# Patient Record
Sex: FEMALE | Race: WHITE | HISPANIC OR LATINO | URBAN - METROPOLITAN AREA
[De-identification: names, ages, dates, MRNs, and addresses within clinical notes are randomized per-mention and may not be internally consistent; named-entity substitution may affect disease eponyms.]

---

## 2019-03-22 ENCOUNTER — TRANSFERRED RECORDS (OUTPATIENT)
Dept: HEALTH INFORMATION MANAGEMENT | Facility: CLINIC | Age: 49
End: 2019-03-22

## 2019-04-08 NOTE — TELEPHONE ENCOUNTER
RECORDS RECEIVED FROM: External - Dr. Rodgers - [facility]   DATE RECEIVED: 4/16/19   NOTES (FOR ALL VISITS) STATUS DETAILS   OFFICE NOTE from referring provider Received 3/22/19  1/8/19   OFFICE NOTE from other specialist N/A    DISCHARGE SUMMARY from hospital N/A    DISCHARGE REPORT from the ER N/A    OPERATIVE REPORT N/A    MEDICATION LIST Received     IMAGING  (FOR ALL VISITS)     EMG N/A    EEG N/A    ECT N/A    MRI (HEAD, NECK, SPINE) N/A    CT (HEAD, NECK, SPINE) N/A      Action    Action Taken Records request faxed to [facility]    (fax) [phone]

## 2019-04-15 NOTE — TELEPHONE ENCOUNTER
Phone Call:     Contact Name [facility]   Outcome Records will be faxed

## 2019-04-16 ENCOUNTER — PRE VISIT (OUTPATIENT)
Dept: NEUROLOGY | Facility: CLINIC | Age: 49
End: 2019-04-16

## 2019-04-16 ENCOUNTER — OFFICE VISIT (OUTPATIENT)
Dept: NEUROLOGY | Facility: CLINIC | Age: 49
End: 2019-04-16
Payer: COMMERCIAL

## 2019-04-16 VITALS
BODY MASS INDEX: 28.55 KG/M2 | WEIGHT: 136 LBS | HEART RATE: 63 BPM | DIASTOLIC BLOOD PRESSURE: 77 MMHG | SYSTOLIC BLOOD PRESSURE: 139 MMHG | HEIGHT: 58 IN | OXYGEN SATURATION: 98 %

## 2019-04-16 DIAGNOSIS — G20.C PARKINSONISM, UNSPECIFIED PARKINSONISM TYPE (H): Primary | ICD-10-CM

## 2019-04-16 DIAGNOSIS — G20.C PARKINSONISM, UNSPECIFIED PARKINSONISM TYPE (H): ICD-10-CM

## 2019-04-16 LAB — VIT B12 SERPL-MCNC: 4325 PG/ML (ref 193–986)

## 2019-04-16 ASSESSMENT — UNIFIED PARKINSONS DISEASE RATING SCALE (UPDRS)
PRONATION_SUPINATION_RIGHT: MILD: ANY OF THE FOLLOWING: A) 3 TO 5 INTERRUPTIONS DURING TAPPING B) MILD SLOWING C) THE AMPLITUDE DECREMENTS MIDWAY IN THE 10-MOVEMENT SEQUENCE
HANDMOVEMENTS_LEFT: SLIGHT: ANY OF THE FOLLOWING: A) THE REGULAR RHYTHM IS BROKEN WITH ONE WITH ONE OR TWO INTERRUPTIONS OR HESITATIONS OF THE MOVEMENT B) SLIGHT SLOWING C) THE AMPLITUDE DECREMENTS NEAR THE END OF THE 10 MOVEMENTS.
SPEECH: NORMAL
TOETAPPING_LEFT: NORMAL
PARKINSONS_MEDS: OFF
TOTAL_SCORE: 26
RIGIDITY_LLE: NORMAL
TOTAL_SCORE: 17
FREEZING_GAIT: NORMAL
AMPLITUDE_LLE: NORMAL: NO TREMOR.
RIGIDITY_RUE: MILD: RIGIDITY DETECTED WITHOUT THE ACTIVATION MANEUVER.  FULL RANGE OF MOTION IS EASILY ACHIEVED.
ARISING_CHAIR: SLIGHT: ARISING IS SLOWER THAN NORMAL, OR MAY NEED MORE THAN ONE ATTEMPT, OR MAY NEED TO MOVE FORWARD IN THE CHAIR TO ARISE.  NO NEED TO USE THE ARMS OF THE CHAIR.
LEG_AGILITY_LEFT: NORMAL
AXIAL_SCORE: 3
RIGIDITY_LUE: SLIGHT: RIGIDITY ONLY DETECTED WITH ACTIVATION MANEUVER.
TOETAPPING_RIGHT: NORMAL
RIGIDITY_NECK: NORMAL
PRONATION_SUPINATION_LEFT: NORMAL
FACIAL_EXPRESSION: NORMAL.
AMPLITUDE_LUE: NORMAL: NO TREMOR.
FINGER_TAPPING_RIGHT: MODERATE: ANY OF THE FOLLOWING:  A) MORE THAN 5 INTERRUPTIONS OR AT LEAST ONE LONGER ARREST (FREEZE) IN ONGOING MOVEMENT  B) MODERATE SLOWING C) THE AMPLITUDE DECREMENTS STARTING AFTER THE FIRST MOVEMENT.
AMPLITUDE_LIP_JAW: NORMAL: NO TREMOR.
TOTAL_SCORE_LEFT: 3
GAIT: MILD: INDEPENDENT WALKING BUT WITH SUBSTANTIAL GAIT IMPAIRMENT.
POSTURAL_STABILITY: NORMAL:  RECOVERS WITH ONE OR TWO STEPS.
SPONTANEITY_OF_MOVEMENT: 0: NORMAL.  NO PROBLEMS.
FINGER_TAPPING_LEFT: SLIGHT: ANY OF THE FOLLOWING: A) THE REGULAR RHYTHM IS BROKEN WITH ONE WITH ONE OR TWO INTERRUPTIONS OR HESITATIONS OF THE MOVEMENT B) SLIGHT SLOWING C) THE AMPLITUDE DECREMENTS NEAR THE END OF THE 10 MOVEMENTS.
RIGIDITY_RLE: NORMAL
CONSTANCY_TREMOR_ATREST: MODERATE: TREMOR AT REST IS PRESENT 51-75% OF THE ENTIRE EXAMINATION PERIOD.
HANDMOVEMENTS_RIGHT: MODERATE: ANY OF THE FOLLOWING:  A) MORE THAN 5 INTERRUPTIONS  OR AT LEAST ONE LONGER ARREST (FREEZE) IN ONGOING MOVEMENT  B) MODERATE SLOWING C) THE AMPLITUDE DECREMENTS STARTING AFTER THE FIRST MOVEMENT.
AMPLITUDE_RUE: MODERATE: 3 - 10 CM IN MAXIMAL AMPLITUDE.
AMPLITUDE_RLE: NORMAL: NO TREMOR.
LEG_AGILITY_RIGHT: NORMAL
POSTURE: 0 NORMAL, NO PROBLEMS

## 2019-04-16 ASSESSMENT — MIFFLIN-ST. JEOR: SCORE: 1132.67

## 2019-04-16 ASSESSMENT — PAIN SCALES - GENERAL: PAINLEVEL: NO PAIN (0)

## 2019-04-16 NOTE — LETTER
2019       RE: Marta Osorio  [address]  [place] [address]     Dear Colleague,    Thank you for referring your patient, Marta Osorio, to the [facility] NEUROLOGY at [facility] acute hospital. Please see a copy of my visit note below.    Department of Neurology  Movement Disorders Division   Initial Clinic Evaluation     Patient: Marta Osorio   MRN: [id number]   : 1970   Date of Visit: 2019     Referring Physician: Ana Maria    Reason for Referral: Tremor    Impression:  1.  Probable mild idiopathic Parkinson's disease  2.  Hypothyroidism on replacement    Recommendations:  1.  MRI of the brain without contrast  2.  Ceruloplasmin, B12 level  3.  See back after the above.  If  the testing is normal as we expect it would be reasonable to give the patient a trial of carbidopa levodopa    We would like to thank Dr. Rodgers for allowing us to participate in this patient's care.    Please call or write with questions or concerns,    Sincerely yours,    Harvey Rivera MD      History of Present Illness  Ms. Jamie Osorio is a 49 year old female who is right-handed.  She works cleaning offices.  She lives in [place].  She comes with her sister-in-law.  Communication was through a [facility]  and communication level was judged as being excellent.    The patient's only other problems are hypothyroidism on replacement.  She has no other medical illnesses and takes no chronic medication.  In preparing this report I reviewed records from Dr. Mcginnis.  Dr. Haas indicated that the patient had right arm tremor at rest for about 2 months.  In reviewing the patient's medication list there was nothing on it that would cause tremor.  Parkinson's disease was suspected and the patient was referred for movement disorder evaluation.    The patient states that in 2019 she was crossing the street and fell.  She suffered  significant facial trauma.  She had quite a bit of bruising around the left orbit.  She did not lose consciousness.  She had x-rays done of the area but no CT scan or MRI scan of the brain.    Shortly after that she began to notice right arm resting tremor.  The tremor goes away if she moves her hand.  Others notice it when she walks.  It has become more severe over the past 2 months.  In the office mild right leg resting tremor was noted.  She does not notice any tremor on the left and no facial tremor.  The patient says the right hand is normally coordinated.  She can brush her teeth with the right hand and scrub her hair with the right hand without difficulty.  She really does not do much hand writing or keyboarding and does not know if it is affecting that at all.  It is not affecting her ability to eat.    She has no family history of parkinsonism or tremor.  She has had no falls other than the ones noted above.  She has had no head injury other than the one noted above.  Again noted is that she has is taking no medicines that would block dopamine.  She has no REM behavior disorder.  She may drool into her pillow at night.  Speech is normal.  Gait appears normal.  She has no imbalance.  She does get some aching in her thighs when she walks.  At times it is hard to get up from a chair.  Her memory is good.  She has no dysautonomia.  She denies any depression.    Past Medical History:   No past medical history on file.    Past Surgical History:   No past surgical history on file.    Medications:  No current outpatient medications on file.        Movement Disorder-related Medications                                                                                                                                                             Allergies: has No Known Allergies.    Social History:   Social History     Socioeconomic History     Marital status: Single     Spouse name: None     Number of children: None      Years of education: None     Highest education level: None   Occupational History     None   Social Needs     Financial resource strain: None     Food insecurity:     Worry: None     Inability: None     Transportation needs:     Medical: None     Non-medical: None   Tobacco Use     Smoking status: Never Smoker     Smokeless tobacco: Never Used   Substance and Sexual Activity     Alcohol use: Not Currently     Drug use: None     Sexual activity: None   Lifestyle     Physical activity:     Days per week: None     Minutes per session: None     Stress: None   Relationships     Social connections:     Talks on phone: None     Gets together: None     Attends Voodoo service: None     Active member of club or organization: None     Attends meetings of clubs or organizations: None     Relationship status: None     Intimate partner violence:     Fear of current or ex partner: None     Emotionally abused: None     Physically abused: None     Forced sexual activity: None   Other Topics Concern     None   Social History Narrative     None       Family History:  No family history on file.    ROS:  General:  Fever : no, Chills: no, Sweats: no, Fatigue: no, ,Weight loss: no  Cardiovascular  Chest Pains: no, Palpitations: no, Edema: no, Shortness of breath: no  Respiratory  Cough: no  Gastrointestinal  Nausea: no, Vomiting: no, Diarrhea: no, Constipation: no  Genitourinary  Dysuria: no, Frequency: no, Urgency: no,  Nocturia: no, Incontinence: no Women:  Abnormal vaginal bleeding: no  Musculoskeletal  Back pain: no, Neck Pain: no  Joint pain, swelling, redness: no, Stiffness: no  Skin  Rash: no, Itching: no,  Suspicious lesions: no  Psychiatric  Depression: no, Anxiety/Panic: no  Endocrine  Cold intolerance: no, Heat intolerance: no, Excessive thirst: no  Hematologic/LymphatiAbnormal bruising, bleeding: no ,Enlarged lymph nodes: {.:[id number]  Allergic/Immunologic  Hives (Urticaria): no, HIV exposure: no    Neurologic  Visual loss:  no, Double vision: no, Headache: no, Loss of consciousness:  no, Seizure: no, Fainting (syncope): no, Dysarthria: no, Dysphagia:  no, Vertigo:  no, Weakness: no, Atrophy: no, Twitches: no, Lhermitte's: no, Numbness or tingling: no, Handwriting change: no, Tremors: no, Involuntary movements: no, Imbalance: no, Abnormal gait:  no, Falls :no, Memory loss: no, RBD: no, Sleep disorder: no, Hallucination: no, Loss of concentration: no,  Behavioral change: no,  Loss of motivation: no      Comprehensive Neurologic Exam    Mental Status Exam   The patient is alert, oriented and exhibits no difficulty with cognition or memory.  A formal short test of mental status was performed.     Neurovascular         Speech and Language   Right Left     Carotid Bruit Absent Absent  Speech is normal.   Dorsalis Pedis Pulse Normal Normal  Description   Posterior Tibial Pulse Normal Normal  Language is normal.     Cranial Nerve Exam                 Right Left   Right Left   II                                        Normal Normal  Hearing (VIII) Normal Normal      III, IV, V!                   Normal Normal  Nystagmus (VIII) Normal Normal   EOM description                              Gag (IX, X) Normal Normal   Facial Sensation (V) Normal Normal  SCM (XI) Normal Normal   Muscles of Mastication (V) Normal Normal  Trapezius (XI) Normal Normal   Facial Strength (VII) Normal Normal  Tongue (XII) Normal Normal     Motor Exam  Strength (*/5 grading)  Muscle                  Right Left  Muscle Right Left   Frontalis                                           Normal Normal  Iliopsoas Normal    Normal          Orbicularis Oculi                     Normal Normal  Quadrideps Normal    Normal        Orbicularis Oris                         Normal Normal  Anterior Tibial Normal Normal      Deltoid Normal Normal  Gastrocnemius Normal    Normal   Biceps Normal Normal  Extensor Hallucis Longus Normal    Normal   Triceps Normal Normal  Toe Extensors Normal     Normal   EDC Normal Normal  Toe Flexor Normal    Normal   Finger Flexors Normal Normal  Other Normal Normal   First Dorsal Interosseous Normal Normal  Other Normal Normal   Hypothenar Normal Normal  Other Normal Normal   Thenar Normal Normal  Other Normal Normal     Reflexes      Tone   Right Left   Right Left   Biceps Normal Normal  Spasticity (S)  Rigidity (R)     Triceps Normal Normal  Neck     Brachioradialis Normal Normal  Arm     Quadriceps Normal Normal  Leg     Ankle Normal Normal       Babkinski Flexor Flexor         UPDRS Values 4/16/2019   Time: 7:53 AM   Medication Off   R Brain DBS: None   L Brain DBS: None   Speech 0   Facial Expression 0   Rigidity Neck 0   Rigidity RUE 2   Rigidity LUE 1   Rigidity RLE 0   Rigidity LLE 0   Finger Taps R 3   Finger Taps L 1   Hand Mvt R 3   Hand Mvt L 1   Pron-/Supinate R 2   Pron-/Supinate L 0   Toe Tap R 0   Toe Tap L 0   Leg Agility R 0   Leg Agility L 0   Arise From Chair 1   Gait 2   Gait Freezing 0   Postural Stability 0   Posture 0   Global Spont Mvt 0   Postural Tremor RUE 2   Postural Tremor LUE 0   Kinetic Tremor RUE 2   Kinetic Tremor LUE 0   Rest Tremor RUE 3   Rest Tremor LUE 0   Rest Tremor RLE 0   Rest Tremor LLE 0   Rest Tremor Lip/Jaw 0   Rest Tremor Constancy 3   Total Right 17   Total Left 3   Axial Total 3   Total 26       Sensory Exam          Right Left    Pin Normal Normal    Vibration Normal Normal    Joint position Normal Normal    Other          Coding statement:     Duration of  Services: face-to-face 90 min.   Greater than 50% of this visit was spent in counseling and coordination of care.    Again, thank you for allowing me to participate in the care of your patient.      Sincerely,    Harvey Rivera MD

## 2019-04-16 NOTE — PATIENT INSTRUCTIONS
1.  You appear to have early Parkinson's disease.  We will check an MRI of your brain and some blood work to make sure it is not something else    2.  I will see you back after your testing and we can start medication for you

## 2019-04-16 NOTE — PROGRESS NOTES
Department of Neurology  Movement Disorders Division   Initial Clinic Evaluation     Patient: Marta Osorio   MRN: [id number]   : 1970   Date of Visit: 2019     Referring Physician: Ana Maria    Reason for Referral: Tremor    Impression:  1.  Probable mild idiopathic Parkinson's disease  2.  Hypothyroidism on replacement      Recommendations:  1.  MRI of the brain without contrast  2.  Ceruloplasmin, B12 level  3.  See back after the above.  If  the testing is normal as we expect it would be reasonable to give the patient a trial of carbidopa levodopa    We would like to thank Dr. Rodgers for allowing us to participate in this patient's care.      Please call or write with questions or concerns,    Sincerely yours,    Harvey Rivera MD      History of Present Illness  Ms. Jamie Osorio is a 49 year old female who is right-handed.  She works cleaning offices.  She lives in [place].  She comes with her sister-in-law.  Communication was through a [facility]  and communication level was judged as being excellent.    The patient's only other problems are hypothyroidism on replacement.  She has no other medical illnesses and takes no chronic medication.  In preparing this report I reviewed records from Dr. Rodgers.  Dr. Rodgers indicated that the patient had right arm tremor at rest for about 2 months.  In reviewing the patient's medication list there was nothing on it that would cause tremor.  Parkinson's disease was suspected and the patient was referred for movement disorder evaluation.    The patient states that in 2019 she was crossing the street and fell.  She suffered significant facial trauma.  She had quite a bit of bruising around the left orbit.  She did not lose consciousness.  She had x-rays done of the area but no CT scan or MRI scan of the brain.    Shortly after that she began to notice right arm resting tremor.  The tremor goes away if she moves her hand.  Others  notice it when she walks.  It has become more severe over the past 2 months.  In the office mild right leg resting tremor was noted.  She does not notice any tremor on the left and no facial tremor.  The patient says the right hand is normally coordinated.  She can brush her teeth with the right hand and scrub her hair with the right hand without difficulty.  She really does not do much hand writing or keyboarding and does not know if it is affecting that at all.  It is not affecting her ability to eat.    She has no family history of parkinsonism or tremor.  She has had no falls other than the ones noted above.  She has had no head injury other than the one noted above.  Again noted is that she has is taking no medicines that would block dopamine.  She has no REM behavior disorder.  She may drool into her pillow at night.  Speech is normal.  Gait appears normal.  She has no imbalance.  She does get some aching in her thighs when she walks.  At times it is hard to get up from a chair.  Her memory is good.  She has no dysautonomia.  She denies any depression.      Past Medical History:   No past medical history on file.    Past Surgical History:   No past surgical history on file.    Medications:  No current outpatient medications on file.          Movement Disorder-related Medications                                                                                                                                                             Allergies: has No Known Allergies.    Social History:   Social History     Socioeconomic History     Marital status: Single     Spouse name: None     Number of children: None     Years of education: None     Highest education level: None   Occupational History     None   Social Needs     Financial resource strain: None     Food insecurity:     Worry: None     Inability: None     Transportation needs:     Medical: None     Non-medical: None   Tobacco Use     Smoking status: Never  Smoker     Smokeless tobacco: Never Used   Substance and Sexual Activity     Alcohol use: Not Currently     Drug use: None     Sexual activity: None   Lifestyle     Physical activity:     Days per week: None     Minutes per session: None     Stress: None   Relationships     Social connections:     Talks on phone: None     Gets together: None     Attends Roman Catholic service: None     Active member of club or organization: None     Attends meetings of clubs or organizations: None     Relationship status: None     Intimate partner violence:     Fear of current or ex partner: None     Emotionally abused: None     Physically abused: None     Forced sexual activity: None   Other Topics Concern     None   Social History Narrative     None       Family History:  No family history on file.    ROS:  General:  Fever : no, Chills: no, Sweats: no, Fatigue: no, ,Weight loss: no  Cardiovascular  Chest Pains: no, Palpitations: no, Edema: no, Shortness of breath: no  Respiratory  Cough: no  Gastrointestinal  Nausea: no, Vomiting: no, Diarrhea: no, Constipation: no  Genitourinary  Dysuria: no, Frequency: no, Urgency: no,  Nocturia: no, Incontinence: no Women:  Abnormal vaginal bleeding: no  Musculoskeletal  Back pain: no, Neck Pain: no  Joint pain, swelling, redness: no, Stiffness: no  Skin  Rash: no, Itching: no,  Suspicious lesions: no  Psychiatric  Depression: no, Anxiety/Panic: no  Endocrine  Cold intolerance: no, Heat intolerance: no, Excessive thirst: no  Hematologic/LymphatiAbnormal bruising, bleeding: no ,Enlarged lymph nodes: {.:[id number]  Allergic/Immunologic  Hives (Urticaria): no, HIV exposure: no    Neurologic  Visual loss: no, Double vision: no, Headache: no, Loss of consciousness:  no, Seizure: no, Fainting (syncope): no, Dysarthria: no, Dysphagia:  no, Vertigo:  no, Weakness: no, Atrophy: no, Twitches: no, Lhermitte's: no, Numbness or tingling: no, Handwriting change: no, Tremors: no, Involuntary movements: no, Imbalance:  no, Abnormal gait:  no, Falls :no, Memory loss: no, RBD: no, Sleep disorder: no, Hallucination: no, Loss of concentration: no,  Behavioral change: no,  Loss of motivation: no      Comprehensive Neurologic Exam    Mental Status Exam   The patient is alert, oriented and exhibits no difficulty with cognition or memory.  A formal short test of mental status was performed.     Neurovascular         Speech and Language   Right Left     Carotid Bruit Absent Absent  Speech is normal.   Dorsalis Pedis Pulse Normal Normal  Description   Posterior Tibial Pulse Normal Normal  Language is normal.     Cranial Nerve Exam                 Right Left   Right Left   II                                        Normal Normal  Hearing (VIII) Normal Normal      III, IV, V!                   Normal Normal  Nystagmus (VIII) Normal Normal   EOM description                              Gag (IX, X) Normal Normal   Facial Sensation (V) Normal Normal  SCM (XI) Normal Normal   Muscles of Mastication (V) Normal Normal  Trapezius (XI) Normal Normal   Facial Strength (VII) Normal Normal  Tongue (XII) Normal Normal     Motor Exam  Strength (*/5 grading)  Muscle                  Right Left  Muscle Right Left   Frontalis                                           Normal Normal  Iliopsoas Normal    Normal          Orbicularis Oculi                     Normal Normal  Quadrideps Normal    Normal        Orbicularis Oris                         Normal Normal  Anterior Tibial Normal Normal      Deltoid Normal Normal  Gastrocnemius Normal    Normal   Biceps Normal Normal  Extensor Hallucis Longus Normal    Normal   Triceps Normal Normal  Toe Extensors Normal    Normal   EDC Normal Normal  Toe Flexor Normal    Normal   Finger Flexors Normal Normal  Other Normal Normal   First Dorsal Interosseous Normal Normal  Other Normal Normal   Hypothenar Normal Normal  Other Normal Normal   Thenar Normal Normal  Other Normal Normal     Reflexes      Tone   Right Left   Right  Left   Biceps Normal Normal  Spasticity (S)  Rigidity (R)     Triceps Normal Normal  Neck     Brachioradialis Normal Normal  Arm     Quadriceps Normal Normal  Leg     Ankle Normal Normal       Babkinski Flexor Flexor         UPDRS Values 4/16/2019   Time: 7:53 AM   Medication Off   R Brain DBS: None   L Brain DBS: None   Speech 0   Facial Expression 0   Rigidity Neck 0   Rigidity RUE 2   Rigidity LUE 1   Rigidity RLE 0   Rigidity LLE 0   Finger Taps R 3   Finger Taps L 1   Hand Mvt R 3   Hand Mvt L 1   Pron-/Supinate R 2   Pron-/Supinate L 0   Toe Tap R 0   Toe Tap L 0   Leg Agility R 0   Leg Agility L 0   Arise From Chair 1   Gait 2   Gait Freezing 0   Postural Stability 0   Posture 0   Global Spont Mvt 0   Postural Tremor RUE 2   Postural Tremor LUE 0   Kinetic Tremor RUE 2   Kinetic Tremor LUE 0   Rest Tremor RUE 3   Rest Tremor LUE 0   Rest Tremor RLE 0   Rest Tremor LLE 0   Rest Tremor Lip/Jaw 0   Rest Tremor Constancy 3   Total Right 17   Total Left 3   Axial Total 3   Total 26         Sensory Exam          Right Left    Pin Normal Normal    Vibration Normal Normal    Joint position Normal Normal    Other             Coding statement:     Duration of  Services: face-to-face 90 min.   Greater than 50% of this visit was spent in counseling and coordination of care.

## 2019-04-18 LAB — CERULOPLASMIN SERPL-MCNC: 33 MG/DL (ref 23–53)

## 2019-07-09 ENCOUNTER — OFFICE VISIT (OUTPATIENT)
Dept: NEUROLOGY | Facility: CLINIC | Age: 49
End: 2019-07-09
Payer: COMMERCIAL

## 2019-07-09 VITALS — DIASTOLIC BLOOD PRESSURE: 83 MMHG | SYSTOLIC BLOOD PRESSURE: 139 MMHG | OXYGEN SATURATION: 99 % | HEART RATE: 71 BPM

## 2019-07-09 DIAGNOSIS — G20.A1 PARKINSON DISEASE (H): Primary | ICD-10-CM

## 2019-07-09 RX ORDER — LEVOTHYROXINE SODIUM 125 UG/1
125 TABLET ORAL DAILY
COMMUNITY
End: 2023-05-09

## 2019-07-09 RX ORDER — CARBIDOPA AND LEVODOPA 25; 100 MG/1; MG/1
TABLET ORAL
Qty: 100 TABLET | Refills: 3 | Status: SHIPPED | OUTPATIENT
Start: 2019-07-09 | End: 2019-09-10

## 2019-07-09 ASSESSMENT — PAIN SCALES - GENERAL: PAINLEVEL: NO PAIN (0)

## 2019-07-09 NOTE — PROGRESS NOTES
Movement Disorder Clinic follow up note    Patient: Marta Osorio  Medical Record Number: [id number]  Encounter Date: July 9, 2019  PCP:Nazanin Rodgers    CC: Parkinson's disease    Impression:  1.  Mild idiopathic Parkinson's disease    Recommendations:  1.  Patient was intolerant of the MRI of the head.  I had received a note that they felt she needed general anesthesia.  I do not think the risk of general anesthesia is warranted for her screening MRI.  We will start her on carbidopa levodopa and only consider the MRI if her response is atypical.  I discussed this with her and she is in agreement.    2.  We will start carbidopa levodopa as usual.  She will take carbidopa levodopa, 25/100, 1/2 tablet 3 times a day, 1/2 to 1-hour before meals.  She was warned of possible nausea.  She will escalate slowly to 1 tablet 3 times a day after 1 week and increase to 1-1/2 tablets 3 times a day as needed.    3.  This was all described in detail through an  and communication was excellent.  She asked appropriate questions.  I will see her back in 2 months to see her response to the medication.    Return to clinic: 2 months    Interval Hx:: Ms. Marta Osorio returns to clinic today for follow-up of who comes in follow-up today.  She is accompanied by .  Is also with her young daughter.    She was intolerant of the MRI.  I had received a note from the technician that it was felt she would need general anesthesia.  She apparently began panicking and had severe movements.    We decided to go ahead and start the carbidopa levodopa.  Her target symptom is tremor.    Current medications    Movement Disorder-related Medications                                                                                                                                                             Current Outpatient Medications   Medication     carbidopa-levodopa (SINEMET)  MG tablet      levothyroxine (SYNTHROID/LEVOTHROID) 125 MCG tablet     No current facility-administered medications for this visit.        Examination:  /83 (BP Location: Left arm, Patient Position: Sitting, Cuff Size: Adult Regular)   Pulse 71   SpO2 99%   General- no distress, no rash, good pulses, no edema  Respiratory-auscultation over the anterior lung fields is clear  Cardiac- regular rate and rhythm    Neurologic  Mental status  Patient is alert, appropriate, speech is fluent and comprehension is intact    Cranial nerve testing  Pupils are equal and reactive to light, visual fields are full to confrontation bilaterally  Extraocular movements are full  Facial sensation is intact, face is symmetric with rest and activation  Palate rises symmetrically, tongue protrudes at midline with normal movements  Sterocleidomastoid and trapezius strength is normal and symmetric    Motor  Bulk and tone are normal  Strength is 5/5 shoulder abduction, finger abduction, arm flexion and extension, hip flexion, plantar and dorsiflexion    Sensation  Intact to pin, vibration   Proprioception intact in great toes and fingers    Tendon reflexes  Testing at brachioradialis, biceps, triceps, patella and achilles bilaterally showed normal reflexes, symmetrically, without Babinski or Marin signs    Coordination  Finger nose finger testing: normalRapid alternating movements are normal.  Gait and Station: normal    25 minutes of total time was spent face-to-face with the patient over 50% of which was counseling..

## 2019-07-09 NOTE — LETTER
7/9/2019       RE: Marta Osorio  [address]  [place] [address]     Dear Colleague,    Thank you for referring your patient, Marta Osorio, to the [facility] NEUROLOGY at [facility]. Please see a copy of my visit note below.    Movement Disorder Clinic follow up note    Patient: Marta Osorio  Medical Record Number: [id number]  Encounter Date: July 9, 2019  PCP:Nazanin Rodgers    CC: Parkinson's disease    Impression:  1.  Mild idiopathic Parkinson's disease    Recommendations:  1.  Patient was intolerant of the MRI of the head.  I had received a note that they felt she needed general anesthesia.  I do not think the risk of general anesthesia is warranted for her screening MRI.  We will start her on carbidopa levodopa and only consider the MRI if her response is atypical.  I discussed this with her and she is in agreement.    2.  We will start carbidopa levodopa as usual.  She will take carbidopa levodopa, 25/100, 1/2 tablet 3 times a day, 1/2 to 1-hour before meals.  She was warned of possible nausea.  She will escalate slowly to 1 tablet 3 times a day after 1 week and increase to 1-1/2 tablets 3 times a day as needed.    3.  This was all described in detail through an  and communication was excellent.  She asked appropriate questions.  I will see her back in 2 months to see her response to the medication.    Return to clinic: 2 months    Interval Hx:: Ms. Marta Osorio returns to clinic today for follow-up of who comes in follow-up today.  She is accompanied by .  Is also with her young daughter.    She was intolerant of the MRI.  I had received a note from the technician that it was felt she would need general anesthesia.  She apparently began panicking and had severe movements.    We decided to go ahead and start the carbidopa levodopa.  Her target symptom is tremor.    Current  medications    Movement Disorder-related Medications                                                                                                                                                             Current Outpatient Medications   Medication     carbidopa-levodopa (SINEMET)  MG tablet     levothyroxine (SYNTHROID/LEVOTHROID) 125 MCG tablet     No current facility-administered medications for this visit.        Examination:  /83 (BP Location: Left arm, Patient Position: Sitting, Cuff Size: Adult Regular)   Pulse 71   SpO2 99%   General- no distress, no rash, good pulses, no edema  Respiratory-auscultation over the anterior lung fields is clear  Cardiac- regular rate and rhythm    Neurologic  Mental status  Patient is alert, appropriate, speech is fluent and comprehension is intact    Cranial nerve testing  Pupils are equal and reactive to light, visual fields are full to confrontation bilaterally  Extraocular movements are full  Facial sensation is intact, face is symmetric with rest and activation  Palate rises symmetrically, tongue protrudes at midline with normal movements  Sterocleidomastoid and trapezius strength is normal and symmetric    Motor  Bulk and tone are normal  Strength is 5/5 shoulder abduction, finger abduction, arm flexion and extension, hip flexion, plantar and dorsiflexion    Sensation  Intact to pin, vibration   Proprioception intact in great toes and fingers    Tendon reflexes  Testing at brachioradialis, biceps, triceps, patella and achilles bilaterally showed normal reflexes, symmetrically, without Babinski or Marin signs    Coordination  Finger nose finger testing: normalRapid alternating movements are normal.  Gait and Station: normal    25 minutes of total time was spent face-to-face with the patient over 50% of which was counseling..    Again, thank you for allowing me to participate in the care of your patient.      Sincerely,    Harvey Rivera MD

## 2019-07-09 NOTE — PATIENT INSTRUCTIONS
We are going to initiate a trial of carbidopa-levodopa (L-dopa, Sinemet) as follows:    1.  Start by taking one-half a carbidopa/levodopa tablet (yellow color) three times a day on an empty stomach.   The best way to do this is to take the medication one-half to one hour before each meal.  It does not need to be spaced eight hours apart.  We give the medication on an empty stomach because protein in food may block the absorption of the medication.    2.  After two weeks, increase the dose to one full tablet three times a day.  If you are completely better you don't need to increase further.  If you still have symptoms increase to 1.5 tablets three times a day after two weeks.    3.  The medication may cause nausea after each dose.  This will usually wear off with time.  Do not increase the dose if you are having nausea.    4.  Call if side-effects are severe or lasting.

## 2019-09-10 ENCOUNTER — OFFICE VISIT (OUTPATIENT)
Dept: NEUROLOGY | Facility: CLINIC | Age: 49
End: 2019-09-10
Payer: COMMERCIAL

## 2019-09-10 VITALS
HEART RATE: 71 BPM | HEIGHT: 58 IN | BODY MASS INDEX: 29.2 KG/M2 | DIASTOLIC BLOOD PRESSURE: 82 MMHG | OXYGEN SATURATION: 96 % | TEMPERATURE: 98.1 F | WEIGHT: 139.1 LBS | SYSTOLIC BLOOD PRESSURE: 122 MMHG | RESPIRATION RATE: 16 BRPM

## 2019-09-10 DIAGNOSIS — G20.A1 PARKINSON DISEASE (H): ICD-10-CM

## 2019-09-10 RX ORDER — CARBIDOPA AND LEVODOPA 25; 100 MG/1; MG/1
TABLET ORAL
Qty: 405 TABLET | Refills: 3 | Status: SHIPPED | OUTPATIENT
Start: 2019-09-10 | End: 2021-06-17

## 2019-09-10 ASSESSMENT — UNIFIED PARKINSONS DISEASE RATING SCALE (UPDRS)
FACIAL_EXPRESSION: NORMAL.
FINGER_TAPPING_LEFT: NORMAL
RIGIDITY_RUE: SLIGHT: RIGIDITY ONLY DETECTED WITH ACTIVATION MANEUVER.
FREEZING_GAIT: NORMAL
CONSTANCY_TREMOR_ATREST: MILD: TREMOR AT REST IS PRESENT 25-50% OF THE ENTIRE EXAMINATION PERIOD.
AMPLITUDE_RLE: NORMAL: NO TREMOR.
AMPLITUDE_LIP_JAW: NORMAL: NO TREMOR.
RIGIDITY_RLE: NORMAL
TOTAL_SCORE: 12
FINGER_TAPPING_RIGHT: MILD: ANY OF THE FOLLOWING: A) 3 TO 5 INTERRUPTIONS DURING TAPPING B) MILD SLOWING C) THE AMPLITUDE DECREMENTS MIDWAY IN THE 10-MOVEMENT SEQUENCE
SPONTANEITY_OF_MOVEMENT: 0: NORMAL.  NO PROBLEMS.
RIGIDITY_LUE: NORMAL
LEG_AGILITY_RIGHT: NORMAL
RIGIDITY_NECK: NORMAL
AMPLITUDE_RUE: MILD > 1 CM BUT < 3 CM IN MAXIMAL AMPLITUDE.
GAIT: MILD: INDEPENDENT WALKING BUT WITH SUBSTANTIAL GAIT IMPAIRMENT.
POSTURAL_STABILITY: NORMAL:  RECOVERS WITH ONE OR TWO STEPS.
AMPLITUDE_LUE: NORMAL: NO TREMOR.
SPEECH: NORMAL
TOTAL_SCORE_LEFT: 0
AXIAL_SCORE: 2
AMPLITUDE_LLE: NORMAL: NO TREMOR.
LEG_AGILITY_LEFT: NORMAL
TOETAPPING_LEFT: NORMAL
POSTURE: 0 NORMAL, NO PROBLEMS
TOTAL_SCORE: 16
PARKINSONS_MEDS: ON
RIGIDITY_LLE: NORMAL
TOETAPPING_RIGHT: NORMAL
ARISING_CHAIR: NORMAL: ABLE TO ARISE QUICKLY WITHOUT HESITATION.
PRONATION_SUPINATION_LEFT: NORMAL
HANDMOVEMENTS_LEFT: NORMAL
PRONATION_SUPINATION_RIGHT: SLIGHT: ANY OF THE FOLLOWING: A) THE REGULAR RHYTHM IS BROKEN WITH ONE WITH ONE OR TWO INTERRUPTIONS OR HESITATIONS OF THE MOVEMENT B) SLIGHT SLOWING C) THE AMPLITUDE DECREMENTS NEAR THE END OF THE 10 MOVEMENTS.
HANDMOVEMENTS_RIGHT: MILD: ANY OF THE FOLLOWING: A) 3 TO 5 INTERRUPTIONS DURING TAPPING B) MILD SLOWING C) THE AMPLITUDE DECREMENTS MIDWAY IN THE 10-MOVEMENT SEQUENCE

## 2019-09-10 ASSESSMENT — PAIN SCALES - GENERAL: PAINLEVEL: NO PAIN (0)

## 2019-09-10 ASSESSMENT — MIFFLIN-ST. JEOR: SCORE: 1146.83

## 2019-09-10 NOTE — NURSING NOTE
Chief Complaint   Patient presents with     MOVEMENT DISORDER     [facility] RETURN     Refill Request     KEIKO Lewis, CMA

## 2019-09-10 NOTE — PROGRESS NOTES
Movement Disorder Clinic follow up note    Patient: Marta Osorio  Medical Record Number: [id number]  Encounter Date: September 10, 2019  PCP:Nazanin Rodgers    CC: Parkinson's disease    Impression:  1.  Mild idiopathic Parkinson's disease  2.  Documented response to levodopa    Recommendations:  1.  The patient comes in off her levodopa for 4 days.  Even with this she has an improvement in her UPDRS part 3 motor score.  I think she does have  idiopathic Parkinson's disease.  2.  I have asked her to increase her carbidopa/levodopa, 25/100, to 1-1/2 tablets 3 times a day on an empty stomach.  3.  She needs to engage in daily exercise for 1/2-hour.  She is very sedentary at this time at home.  She does physical work through the day.  4.  I will see her back in 3 months.  We would like to see her UPDRS part 3 motor score declined below 10.  Discussed with her in full.    Return to clinic: 3 months     Interval Hx:: Ms. Marta Osorio returns to clinic today for follow-up of her parkinsonism. She had had a full work-up with blood work.  She could not tolerate an MRI.    She started on carbidopa/levodopa, 25/100, in July 2019.  She was taking 1 tablet 3 times a day 1/2 to 1-hour before meals.  It was causing no nausea.  She said it helped her a little bit.  Her tremor is better at most times except when she is upset.  Her walking is not changed.  She has not noticed any other changes.  At the end of the visit she told me she ran out of the medicine and has not taken it for the past 4 days.    She has had no other progression of symptoms.  She is not freezing.  She does not fall.  She has no dyskinesia.  She has no fluctuation in her response.  She has had no dysautonomia.  Cognitive status remains good.  She continues to work cleaning offices.  She says when she gets home she is exhausted and cannot exercise.    Current medications    Movement Disorder-related Medications                     "a.m.  noon  p.m.     Carbidopa/levodopa, 25/100                                                1 1 1                                                                                 Current Outpatient Medications   Medication     carbidopa-levodopa (SINEMET)  MG tablet     levothyroxine (SYNTHROID/LEVOTHROID) 125 MCG tablet     No current facility-administered medications for this visit.        Examination:  /82 (BP Location: Left arm, Patient Position: Sitting, Cuff Size: Adult Regular)   Pulse 71   Temp 98.1  F (36.7  C) (Oral)   Resp 16   Ht 1.467 m (4' 9.76\")   Wt 63.1 kg (139 lb 1.6 oz)   SpO2 96%   BMI 29.32 kg/m    General- no distress, no rash, good pulses, no edema  Respiratory-auscultation over the anterior lung fields is clear  Cardiac- regular rate and rhythm    Neurologic  Mental status  Patient is alert, appropriate, speech is fluent and comprehension is intact    Cranial nerve testing  Pupils are equal and reactive to light, visual fields are full to confrontation bilaterally  Extraocular movements are full  Facial sensation is intact, face is symmetric with rest and activation  Palate rises symmetrically, tongue protrudes at midline with normal movements  Sterocleidomastoid and trapezius strength is normal and symmetric    Motor  UPDRS Values 4/16/2019 9/10/2019   Time: 7:53 AM 8:59 AM   Medication Off On   R Brain DBS: None None   L Brain DBS: None None   Speech 0 0   Facial Expression 0 0   Rigidity Neck 0 0   Rigidity RUE 2 1   Rigidity LUE 1 0   Rigidity RLE 0 0   Rigidity LLE 0 0   Finger Taps R 3 2   Finger Taps L 1 0   Hand Mvt R 3 2   Hand Mvt L 1 0   Pron-/Supinate R 2 1   Pron-/Supinate L 0 0   Toe Tap R 0 0   Toe Tap L 0 0   Leg Agility R 0 0   Leg Agility L 0 0   Arise From Chair 1 0   Gait 2 2   Gait Freezing 0 0   Postural Stability 0 0   Posture 0 0   Global Spont Mvt 0 0   Postural Tremor RUE 2 2   Postural Tremor LUE 0 0   Kinetic Tremor RUE 2 2   Kinetic Tremor LUE " 0 0   Rest Tremor RUE 3 2   Rest Tremor LUE 0 0   Rest Tremor RLE 0 0   Rest Tremor LLE 0 0   Rest Tremor Lip/Jaw 0 0   Rest Tremor Constancy 3 2   Total Right 17 12   Total Left 3 0   Axial Total 3 2   Total 26 16   Sensation  Intact to pin, vibration   Proprioception intact in great toes and fingers    Tendon reflexes  Testing at brachioradialis, biceps, triceps, patella and achilles bilaterally showed normal reflexes, symmetrically, without Babinski or Marin signs    Coordination  Finger nose finger testing: normalRapid alternating movements are normal.  Gait and Station: normal    25 minutes of total time was spent face-to-face with the patient over 50% of which was counseling..

## 2019-09-10 NOTE — LETTER
9/10/2019       RE: Marta Osorio  [address]  [place] [address]     Dear Colleague,    Thank you for referring your patient, Marta Osorio, to the [facility] Please see a copy of my visit note below.    Movement Disorder Clinic follow up note    Patient: Marta Osorio  Medical Record Number: [id number]  Encounter Date: September 10, 2019  PCP:Nazanin Rodgers    CC: Parkinson's disease    Impression:  1.  Mild idiopathic Parkinson's disease  2.  Documented response to levodopa    Recommendations:  1.  The patient comes in off her levodopa for 4 days.  Even with this she has an improvement in her UPDRS part 3 motor score.  I think she does have  idiopathic Parkinson's disease.  2.  I have asked her to increase her carbidopa/levodopa, 25/100, to 1-1/2 tablets 3 times a day on an empty stomach.  3.  She needs to engage in daily exercise for 1/2-hour.  She is very sedentary at this time at home.  She does physical work through the day.  4.  I will see her back in 3 months.  We would like to see her UPDRS part 3 motor score declined below 10.  Discussed with her in full.    Return to clinic: 3 months     Interval Hx:: Ms. Marta Osorio returns to clinic today for follow-up of her parkinsonism. She had had a full work-up with blood work.  She could not tolerate an MRI.    She started on carbidopa/levodopa, 25/100, in July 2019.  She was taking 1 tablet 3 times a day 1/2 to 1-hour before meals.  It was causing no nausea.  She said it helped her a little bit.  Her tremor is better at most times except when she is upset.  Her walking is not changed.  She has not noticed any other changes.  At the end of the visit she told me she ran out of the medicine and has not taken it for the past 4 days.    She has had no other progression of symptoms.  She is not freezing.  She does not fall.  She has  "no dyskinesia.  She has no fluctuation in her response.  She has had no dysautonomia.  Cognitive status remains good.  She continues to work cleaning offices.  She says when she gets home she is exhausted and cannot exercise.    Current medications    Movement Disorder-related Medications                    a.m.  noon  p.m.     Carbidopa/levodopa, 25/100                                                1 1 1                                                                                 Current Outpatient Medications   Medication     carbidopa-levodopa (SINEMET)  MG tablet     levothyroxine (SYNTHROID/LEVOTHROID) 125 MCG tablet     No current facility-administered medications for this visit.        Examination:  /82 (BP Location: Left arm, Patient Position: Sitting, Cuff Size: Adult Regular)   Pulse 71   Temp 98.1  F (36.7  C) (Oral)   Resp 16   Ht 1.467 m (4' 9.76\")   Wt 63.1 kg (139 lb 1.6 oz)   SpO2 96%   BMI 29.32 kg/m     General- no distress, no rash, good pulses, no edema  Respiratory-auscultation over the anterior lung fields is clear  Cardiac- regular rate and rhythm    Neurologic  Mental status  Patient is alert, appropriate, speech is fluent and comprehension is intact    Cranial nerve testing  Pupils are equal and reactive to light, visual fields are full to confrontation bilaterally  Extraocular movements are full  Facial sensation is intact, face is symmetric with rest and activation  Palate rises symmetrically, tongue protrudes at midline with normal movements  Sterocleidomastoid and trapezius strength is normal and symmetric    Motor  UPDRS Values 4/16/2019 9/10/2019   Time: 7:53 AM 8:59 AM   Medication Off On   R Brain DBS: None None   L Brain DBS: None None   Speech 0 0   Facial Expression 0 0   Rigidity Neck 0 0   Rigidity RUE 2 1   Rigidity LUE 1 0   Rigidity RLE 0 0   Rigidity LLE 0 0   Finger Taps R 3 2   Finger Taps L 1 0   Hand Mvt R 3 2   Hand Mvt L 1 0   Pron-/Supinate R 2 " 1   Pron-/Supinate L 0 0   Toe Tap R 0 0   Toe Tap L 0 0   Leg Agility R 0 0   Leg Agility L 0 0   Arise From Chair 1 0   Gait 2 2   Gait Freezing 0 0   Postural Stability 0 0   Posture 0 0   Global Spont Mvt 0 0   Postural Tremor RUE 2 2   Postural Tremor LUE 0 0   Kinetic Tremor RUE 2 2   Kinetic Tremor LUE 0 0   Rest Tremor RUE 3 2   Rest Tremor LUE 0 0   Rest Tremor RLE 0 0   Rest Tremor LLE 0 0   Rest Tremor Lip/Jaw 0 0   Rest Tremor Constancy 3 2   Total Right 17 12   Total Left 3 0   Axial Total 3 2   Total 26 16   Sensation  Intact to pin, vibration   Proprioception intact in great toes and fingers    Tendon reflexes  Testing at brachioradialis, biceps, triceps, patella and achilles bilaterally showed normal reflexes, symmetrically, without Babinski or Marin signs    Coordination  Finger nose finger testing: normalRapid alternating movements are normal.  Gait and Station: normal    25 minutes of total time was spent face-to-face with the patient over 50% of which was counseling..      Again, thank you for allowing me to participate in the care of your patient.      Sincerely,    Harvey Rivera MD

## 2019-09-10 NOTE — PATIENT INSTRUCTIONS
1.  You are doing very well  2.  Please increase your carbidipa/levodopa  25/100 to 1.5 tablets three times a day.  Take it 1/2 to 1 hour before meals.  3.  I will see you back in 3 months  4.  Please exercise 1/2 hour a day

## 2021-06-05 RX ORDER — OMEPRAZOLE 20 MG/1
20 TABLET, DELAYED RELEASE ORAL DAILY
COMMUNITY
End: 2021-06-17

## 2021-06-05 RX ORDER — OMEGA-3 FATTY ACIDS/FISH OIL 300-1000MG
200 CAPSULE ORAL EVERY 6 HOURS PRN
COMMUNITY
End: 2022-05-13

## 2021-06-05 NOTE — PROGRESS NOTES
Diagnosis/Summary/Recommendations:    PATIENT: Marta Osorio  51 year old female     : 1970    LEONARDO: 2021    [address]   [place] [address]  [email or web address]       [phone] (H)     Has 3 daughters: 12, 17 and 18   From [place]    Assessment:    (G20) Parkinsonism, unspecified Parkinsonism type (H)  (primary encounter diagnosis)  Carbidopa/levodopa Sinemet 25/100    Communication was through a [facility]  via IPAD.     Reviewed history, started having R hand tremors that started a few years ago. Diagnosed with mild idiopathic Parkinson's disease by Dr Alonso. Prescribed carbidopa/levodopa 25/100, 1.5 tabs TID.  No gait issues or falls.  No  Progression since visit, has not followed up due to COVID pandemic. Continues to have the tremors and does not feel like the carbidopa/levodopa is helping with this. No side effects from the medication.     Gait/Balance/Falls: No falls, feels gait and balance is normal     Exercise/Therapy: Does not exercise regularly, but her job is fairly active     Cognitive/Driving: No issues    Mood: No issues, but was tearful about her diagnosis during the interview       Hallucinations/delusions     Sleep: difficulty falling asleep at night, gets about 6-7 hours, no naps during the day, no dream enactment    Bladder: No issues    GI/Constipation/GERD: Used to be on omeprazole, but no longer on that    ENDO   Levothyroxine synthroid/levothyroid 125mcg    Cardio/heart     Vision     Heme     Other: No anosmia, no dream enactment      Medications     0800 1400      Carbidopa/levodopa Sinemet 25/100 1.5 1.5 1.5     Ibuprofen advil/motrin 200mg (as needed, does not typically take)        Levothyroxine synthroid/levothyroid 125mcg 1       Trihexyphenidyl artane 2mg  2  1/2                                                                                                       Last carbidopa/levodopa dose at 2:00PM today.      Plan:    Add trihexyphenidyl 1/2 of 2 mg by mouth twice daily     Continue sinemet 25/100 1.5 tabs 3/day    Return back in 3 months    Consultation with Krysta Morris, Pharmacist      Coding statement:   Medical Decision Making:  #  Chronic progressive medical conditions addressed  Parkinson and gi    Review and/or interpretation of unique test or documentation from a provider outside of neurologyno  Independent historian provided additional details   - interpreterI  Prescription drug management and review of potential side effects and/or monitoring for side effects  yes   Health impacted by social determinants of health  no    I have reviewed the note as documented above.  This accurately captures the substance of my conversation with the patient and total time spent preparing for visit, executing visit and completing visit on the day of the visit:  40 minutes.      Magdaleno Manzano MD     ______________________________________    Last visit date and details:             ______________________________________      Patient was asked about 14 Review of systems including changes in vision (dry eyes, double vision), hearing, heart, lungs, musculoskeletal, depression, anxiety, snoring, RBD, insomnia, urinary frequency, urinary urgency, constipation, swallowing problems, hematological, ID, allergies, skin problems: seborrhea, endocrinological: thyroid, diabetes, cholesterol; balance, weight changes, and other neurological problems and these were not significant at this time except for There is no problem list on file for this patient.       No Known Allergies  No past surgical history on file.  No past medical history on file.  Social History     Socioeconomic History     Marital status: Single     Spouse name: Not on file     Number of children: Not on file     Years of education: Not on file     Highest education level: Not on file   Occupational History     Not on file   Social Needs     Financial resource strain: Not on  file     Food insecurity     Worry: Not on file     Inability: Not on file     Transportation needs     Medical: Not on file     Non-medical: Not on file   Tobacco Use     Smoking status: Never Smoker     Smokeless tobacco: Never Used   Substance and Sexual Activity     Alcohol use: Not Currently     Drug use: Not on file     Sexual activity: Not on file   Lifestyle     Physical activity     Days per week: Not on file     Minutes per session: Not on file     Stress: Not on file   Relationships     Social connections     Talks on phone: Not on file     Gets together: Not on file     Attends Nondenominational service: Not on file     Active member of club or organization: Not on file     Attends meetings of clubs or organizations: Not on file     Relationship status: Not on file     Intimate partner violence     Fear of current or ex partner: Not on file     Emotionally abused: Not on file     Physically abused: Not on file     Forced sexual activity: Not on file   Other Topics Concern     Not on file   Social History Narrative     Not on file       Drug and lactation database from the [place] [employer]:  http://toxnet.nlm.nih.gov/cgi-bin/sis/htmlgen?LACT      B/P: Data Unavailable, T: Data Unavailable, P: Data Unavailable, R: Data Unavailable 0 lbs 0 oz  There were no vitals taken for this visit., There is no height or weight on file to calculate BMI.  Medications and Vitals not listed above were documented in the cart and reviewed by me.     Current Outpatient Medications   Medication Sig Dispense Refill     carbidopa-levodopa (SINEMET)  MG tablet Take 1.5 tablets three times a day, 1/2 to 1 hour before meals 405 tablet 3     levothyroxine (SYNTHROID/LEVOTHROID) 125 MCG tablet Take 125 mcg by mouth daily         Magdaleno Manzano MD

## 2021-06-17 ENCOUNTER — OFFICE VISIT (OUTPATIENT)
Dept: NEUROLOGY | Facility: CLINIC | Age: 51
End: 2021-06-17
Payer: COMMERCIAL

## 2021-06-17 VITALS
WEIGHT: 141 LBS | BODY MASS INDEX: 29.72 KG/M2 | DIASTOLIC BLOOD PRESSURE: 80 MMHG | RESPIRATION RATE: 16 BRPM | HEART RATE: 71 BPM | OXYGEN SATURATION: 98 % | SYSTOLIC BLOOD PRESSURE: 118 MMHG

## 2021-06-17 DIAGNOSIS — G20.A1 PARKINSON DISEASE (H): ICD-10-CM

## 2021-06-17 DIAGNOSIS — G20.C PARKINSONISM, UNSPECIFIED PARKINSONISM TYPE (H): Primary | ICD-10-CM

## 2021-06-17 PROCEDURE — 99215 OFFICE O/P EST HI 40 MIN: CPT | Performed by: PSYCHIATRY & NEUROLOGY

## 2021-06-17 RX ORDER — CARBIDOPA AND LEVODOPA 25; 100 MG/1; MG/1
TABLET ORAL
Qty: 405 TABLET | Refills: 3 | Status: SHIPPED | OUTPATIENT
Start: 2021-06-17 | End: 2022-05-13

## 2021-06-17 RX ORDER — TRIHEXYPHENIDYL HYDROCHLORIDE 2 MG/1
TABLET ORAL
Qty: 30 TABLET | Refills: 11 | COMMUNITY
Start: 2021-06-17 | End: 2021-06-22

## 2021-06-17 ASSESSMENT — PAIN SCALES - GENERAL: PAINLEVEL: NO PAIN (0)

## 2021-06-17 NOTE — NURSING NOTE
Chief Complaint   Patient presents with     Parkinson     [facility] NEW MOVEMENT DISORDER        Kenn Scott, EMT

## 2021-06-17 NOTE — PATIENT INSTRUCTIONS
Assessment:    (G20) Parkinsonism, unspecified Parkinsonism type (H)  (primary encounter diagnosis)  Carbidopa/levodopa Sinemet 25/100    Communication was through a [facility]  via IPAD.     Reviewed history, started having R hand tremors that started a few years ago. Diagnosed with mild idiopathic Parkinson's disease by Dr Alonso. Prescribed carbidopa/levodopa 25/100, 1.5 tabs TID.  No gait issues or falls.  No  Progression since visit, has not followed up due to COVID pandemic. Continues to have the tremors and does not feel like the carbidopa/levodopa is helping with this. No side effects from the medication.     Gait/Balance/Falls: No falls, feels gait and balance is normal     Exercise/Therapy: Does not exercise regularly, but her job is fairly active     Cognitive/Driving: No issues    Mood: No issues, but was tearful about her diagnosis during the interview troday      Hallucinations/delusions     Sleep: difficulty falling asleep at night, gets about 6-7 hours, no naps during the day, no dream enactment    Bladder: No issues    GI/Constipation/GERD: Used to be on omeprazole, but no longer on that    ENDO   Levothyroxine synthroid/levothyroid 125mcg    Cardio/heart     Vision     Heme     Other: No anosmia, no dream enactment      Medications     0800 1400 2000     Carbidopa/levodopa Sinemet 25/100 1.5 1.5 1.5     Ibuprofen advil/motrin 200mg (as needed, does not typically take)        Levothyroxine synthroid/levothyroid 125mcg 1       Trihexyphenidyl artane 2mg  1/2  1/2                                                                                                       Last carbidopa/levodopa dose at 2:00PM today.     Plan:    Add trihexyphenidyl 1/2 of 2 mg by mouth twice daily     Continue sinemet 25/100 1.5 tabs 3/day    Return back in 3 months    Consultation with Krysta Morris, Pharmacist

## 2021-06-17 NOTE — LETTER
2021       RE: Marta Osorio  [address]  [place] [address]     Dear Colleague,    Thank you for referring your patient, Marta Osorio, to the [facility] [place] at [facility]. Please see a copy of my visit note below.    Diagnosis/Summary/Recommendations:    PATIENT: Marta Osorio  51 year old female     : 1970    LEONARDO: 2021    [address]   [place] [address]  [email or web address]       [phone] (H)     Has 3 daughters: 12, 17 and 18   From [place]    Assessment:    (G20) Parkinsonism, unspecified Parkinsonism type (H)  (primary encounter diagnosis)  Carbidopa/levodopa Sinemet 25/100    Communication was through a [facility]  via IPAD.     Reviewed history, started having R hand tremors that started a few years ago. Diagnosed with mild idiopathic Parkinson's disease by Dr Alonso. Prescribed carbidopa/levodopa 25/100, 1.5 tabs TID.  No gait issues or falls.  No  Progression since visit, has not followed up due to COVID pandemic. Continues to have the tremors and does not feel like the carbidopa/levodopa is helping with this. No side effects from the medication.     Gait/Balance/Falls: No falls, feels gait and balance is normal     Exercise/Therapy: Does not exercise regularly, but her job is fairly active     Cognitive/Driving: No issues    Mood: No issues, but was tearful about her diagnosis during the interview       Hallucinations/delusions     Sleep: difficulty falling asleep at night, gets about 6-7 hours, no naps during the day, no dream enactment    Bladder: No issues    GI/Constipation/GERD: Used to be on omeprazole, but no longer on that    ENDO   Levothyroxine synthroid/levothyroid 125mcg    Cardio/heart     Vision     Heme     Other: No anosmia, no dream enactment      Medications     0800 1400      Carbidopa/levodopa  Sinemet 25/100 1.5 1.5 1.5     Ibuprofen advil/motrin 200mg (as needed, does not typically take)        Levothyroxine synthroid/levothyroid 125mcg 1       Trihexyphenidyl artane 2mg  1/2  1/2                                                                                                       Last carbidopa/levodopa dose at 2:00PM today.     Plan:    Add trihexyphenidyl 1/2 of 2 mg by mouth twice daily     Continue sinemet 25/100 1.5 tabs 3/day    Return back in 3 months    Consultation with Krysta Morris, Pharmacist      Coding statement:   Medical Decision Making:  #  Chronic progressive medical conditions addressed  Parkinson and gi    Review and/or interpretation of unique test or documentation from a provider outside of neurologyno  Independent historian provided additional details   - interpreterI  Prescription drug management and review of potential side effects and/or monitoring for side effects  yes   Health impacted by social determinants of health  no    I have reviewed the note as documented above.  This accurately captures the substance of my conversation with the patient and total time spent preparing for visit, executing visit and completing visit on the day of the visit:  40 minutes.      Magdaleno Manzano MD     ______________________________________    Last visit date and details:             ______________________________________      Patient was asked about 14 Review of systems including changes in vision (dry eyes, double vision), hearing, heart, lungs, musculoskeletal, depression, anxiety, snoring, RBD, insomnia, urinary frequency, urinary urgency, constipation, swallowing problems, hematological, ID, allergies, skin problems: seborrhea, endocrinological: thyroid, diabetes, cholesterol; balance, weight changes, and other neurological problems and these were not significant at this time except for There is no problem list on file for this patient.       No Known Allergies  No past surgical history on  file.  No past medical history on file.  Social History     Socioeconomic History     Marital status: Single     Spouse name: Not on file     Number of children: Not on file     Years of education: Not on file     Highest education level: Not on file   Occupational History     Not on file   Social Needs     Financial resource strain: Not on file     Food insecurity     Worry: Not on file     Inability: Not on file     Transportation needs     Medical: Not on file     Non-medical: Not on file   Tobacco Use     Smoking status: Never Smoker     Smokeless tobacco: Never Used   Substance and Sexual Activity     Alcohol use: Not Currently     Drug use: Not on file     Sexual activity: Not on file   Lifestyle     Physical activity     Days per week: Not on file     Minutes per session: Not on file     Stress: Not on file   Relationships     Social connections     Talks on phone: Not on file     Gets together: Not on file     Attends [employer] service: Not on file     Active member of club or organization: Not on file     Attends meetings of clubs or organizations: Not on file     Relationship status: Not on file     Intimate partner violence     Fear of current or ex partner: Not on file     Emotionally abused: Not on file     Physically abused: Not on file     Forced sexual activity: Not on file   Other Topics Concern     Not on file   Social History Narrative     Not on file       Drug and lactation database from the [place] National Library of Medicine:  http://toxnet.nlm.nih.gov/cgi-bin/sis/htmlgen?LACT      B/P: Data Unavailable, T: Data Unavailable, P: Data Unavailable, R: Data Unavailable 0 lbs 0 oz  There were no vitals taken for this visit., There is no height or weight on file to calculate BMI.  Medications and Vitals not listed above were documented in the cart and reviewed by me.     Current Outpatient Medications   Medication Sig Dispense Refill     carbidopa-levodopa (SINEMET)  MG tablet Take 1.5  tablets three times a day, 1/2 to 1 hour before meals 405 tablet 3     levothyroxine (SYNTHROID/LEVOTHROID) 125 MCG tablet Take 125 mcg by mouth daily         Magdaleno Manzano MD

## 2021-06-20 ENCOUNTER — HEALTH MAINTENANCE LETTER (OUTPATIENT)
Age: 51
End: 2021-06-20

## 2021-06-22 ENCOUNTER — APPOINTMENT (OUTPATIENT)
Dept: INTERPRETER SERVICES | Facility: CLINIC | Age: 51
End: 2021-06-22
Payer: COMMERCIAL

## 2021-06-22 ENCOUNTER — VIRTUAL VISIT (OUTPATIENT)
Dept: PHARMACY | Facility: CLINIC | Age: 51
End: 2021-06-22
Attending: PSYCHIATRY & NEUROLOGY
Payer: COMMERCIAL

## 2021-06-22 DIAGNOSIS — E03.9 HYPOTHYROIDISM, UNSPECIFIED TYPE: ICD-10-CM

## 2021-06-22 DIAGNOSIS — G20.A1 PARKINSON'S DISEASE (H): Primary | ICD-10-CM

## 2021-06-22 PROCEDURE — 99607 MTMS BY PHARM ADDL 15 MIN: CPT | Performed by: PHARMACIST

## 2021-06-22 PROCEDURE — 99605 MTMS BY PHARM NP 15 MIN: CPT | Performed by: PHARMACIST

## 2021-06-22 RX ORDER — TRIHEXYPHENIDYL HYDROCHLORIDE 2 MG/1
TABLET ORAL
Qty: 90 TABLET | Refills: 11 | Status: SHIPPED | OUTPATIENT
Start: 2021-06-22 | End: 2022-05-13

## 2021-06-22 NOTE — Clinical Note
The Artane was entered as historical so I e-prescribed to her pharmacy with additional directions on how to titrate to 1 tab 3 times/day over the next few weeks. I'll check in with her next month.

## 2021-06-22 NOTE — PATIENT INSTRUCTIONS
Recommendations from today's MTM visit:                                                      Start trihexyphenidyl (Artane) 2 mg: Take one-half tablet (1 mg) by mouth once daily for 5 days, then increase to one-half tablet (1 mg) twice daily for 5 days, then increase to 1 tablet (2 mg) twice daily for 5 days, then increase to 1 tablet (2 mg) 3 times/day thereafter    Follow-up: Return in 5 weeks (on 7/27/2021) for Medication Therapy Management (telephone visit at 4 pm).    It was great to speak with you today.  I value your experience and would be very thankful for your time with providing feedback on our clinic survey. You may receive a survey via email or text message in the next few days.     To schedule another MTM appointment, please call the clinic directly or you may call the MTM scheduling line at [phone] or toll-free at [phone].     My Clinical Pharmacist's contact information:                                                      Please feel free to contact me with any questions or concerns you have.      Krysta Morris, Pharm.D.  Medication Therapy Management Pharmacist  Phone: [phone]

## 2021-06-22 NOTE — PROGRESS NOTES
Medication Therapy Management (MTM) Encounter    ASSESSMENT:                            Medication Adherence/Access: No issues identified    Parkinson's Disease:  Agree with adding Artane given her relatively young age; however, it appears the order was entered as historical medication and not e-prescribed to her pharmacy. I have now ordered the medication and included directions on how to titrate the dose to 6 mg/day over the next few weeks and I will follow up with her next month.    Hypothyroidism: Appears stable    PLAN:                            Start trihexyphenidyl (Artane) 2 mg: Take one-half tablet (1 mg) by mouth once daily for 5 days, then increase to one-half tablet (1 mg) twice daily for 5 days, then increase to 1 tablet (2 mg) twice daily for 5 days, then increase to 1 tablet (2 mg) 3 times/day thereafter    Follow-up: Return in 5 weeks (on 7/27/2021) for Medication Therapy Management (telephone visit at 4 pm).      SUBJECTIVE/OBJECTIVE:                          Marta Osorio is a 51 year old female called for an initial visit. She was referred to me from Dr. Manzano.  was present during today's visit.     Reason for visit: discuss Parkinson's disease medications.    Allergies/ADRs: Reviewed in chart  Tobacco: She reports that she has never smoked. She has never used smokeless tobacco.  Alcohol: not currently using  Past Medical History: Reviewed in chart    Medication Adherence/Access: no issues reported    Parkinson's Disease:  Current medications include: Carbidopa-levodopa  mg 1.5 tablets 3 times/day. She recently met with Dr. Manzano and was prescribed Artane but has not been able to  the new prescription due to transportation barriers. Patient did not have any questions about the new medication and she plans to pick it up today. Goal of improving tremor with Artane and will continue carbidopa-levodopa. She takes ibuprofen periodically for pain.      Hypothyroidism: Patient is taking levothyroxine 125 mcg daily. Patient is having the following symptoms: none. Reports recent thyroid labs were good, but these levels were not available in her medical record.    Today's Vitals: There were no vitals taken for this visit.  ----------------    I spent 8 minutes with this patient today. All changes were made via collaborative practice agreement with Dr. Manzano. A copy of the visit note was provided to the patient's referring provider.    The patient was sent via [employer] a summary of these recommendations.     Krysta Morris, Pharm.D.  Medication Therapy Management Pharmacist  Phone: [phone]    Telemedicine Visit Details  Type of service:  Telephone visit  Start Time: 10:01 AM  End Time: 10:09 AM  Originating Location (patient location): Home  Distant Location (provider location):  [facility]      Medication Therapy Recommendations  Parkinson's disease (H)    Current Medication: trihexyphenidyl (ARTANE) 2 MG tablet   Rationale: Medication product not available - Adherence - Adherence   Recommendation: Start Medication - trihexyphenidyl 2 MG tablet - ordered medication   Status: Accepted per CPA

## 2021-07-27 ENCOUNTER — TELEPHONE (OUTPATIENT)
Dept: PHARMACY | Facility: CLINIC | Age: 51
End: 2021-07-27

## 2021-07-27 NOTE — TELEPHONE ENCOUNTER
Attempted to reach patient for scheduled MTM visit today at 4 pm. Used  (ID# [id number]) to call patient and she didn't answer x 2 so we left a voicemail and encouraged her to reschedule the appointment.    Krysta Morris, Pharm.D.  Medication Therapy Management Pharmacist  Phone: [phone]

## 2021-10-10 ENCOUNTER — HEALTH MAINTENANCE LETTER (OUTPATIENT)
Age: 51
End: 2021-10-10

## 2021-12-05 ENCOUNTER — HEALTH MAINTENANCE LETTER (OUTPATIENT)
Age: 51
End: 2021-12-05

## 2022-02-14 ENCOUNTER — TRANSFERRED RECORDS (OUTPATIENT)
Dept: HEALTH INFORMATION MANAGEMENT | Facility: CLINIC | Age: 52
End: 2022-02-14
Payer: COMMERCIAL

## 2022-02-14 ENCOUNTER — MEDICAL CORRESPONDENCE (OUTPATIENT)
Dept: HEALTH INFORMATION MANAGEMENT | Facility: CLINIC | Age: 52
End: 2022-02-14
Payer: COMMERCIAL

## 2022-02-14 LAB — TSH SERPL-ACNC: 1.3 MIU/L (ref 0.4–4.5)

## 2022-05-02 NOTE — PROGRESS NOTES
Diagnosis/Summary/Recommendations:    PATIENT: Marta Osorio  52 year old female     : 1970    LEONARDO: May 13, 2022    MRN: [id number]    [address]   [place] [address]  [email or web address]      [phone] (H)      Has 3 daughters: 12, 17 and 18   From [place]      Assessment:  (G20) Parkinson disease (H)  (primary encounter diagnosis)  Carbidopa/levodopa Sinemet 25/100  Trihexyphenidyl artane 2mg      Reviewed history, started having R hand tremors that started a few years ago. Diagnosed with mild idiopathic Parkinson's disease by Dr Alonso. Prescribed carbidopa/levodopa 25/100, 1.5 tabs TID.  No gait issues or falls.  No  Progression since visit, has not followed up due to COVID pandemic. Continues to have the tremors and does not feel like the carbidopa/levodopa is helping with this. No side effects from the medication.     Review of diagnosis    Parkinson disease    Avoidance of dopamine blockers   Not taking    Motor complication review       Review of Impulse control disorders       Review of surgical or medication options       Gait/Balance/Falls   Not falling    Exercise/Therapy performed/offered   Working and walking a lot  [place] offices [place]    Cognitive/Driving   Not driving    Mood   Has been tearful in the past about diagnosis  Mood stable   Living with 3 daughters     Hallucinations/delusions   no    Sleep   difficulty falling asleep at night, gets about 6-7 hours, no naps during the day, no dream enactment  1030/11pm -= 7 am  8 hours - Monday through friday    Bladder/Renal/Prostate/Gyn/Other  No urgency     GI/Constipation/GERD   Used to be on omeprazole, but no longer on that      ENDO/Lipid/DM/Bone density/Thyroid  Levothyroxine synthroid/levothyroid 125mcg    Cardio/heart/Hyper or Hypotensive   No problems    Vision/Dry Eyes/Cataracts/Glaucoma/Macular   No problems  Wears  glasses    Heme/Anticoagulation/Antiplatelet/Anemia/Other  no    ENT/Resp  No problems    Skin/Cancer/Seborrhea/other      Musculoskeletal/Pain/Headache  Ibuprofen advil/motrin 200mg as needed    Other:        Medications     0800 1400 2000     Carbidopa/levodopa Sinemet 25/100 1.5 1.5 1.5     Ibuprofen advil/motrin 200mg prn         Levothyroxine synthroid/levothyroid 125mcg 1         Trihexyphenidyl artane 2mg  no                                                        Plan:    Will increase her sinemet to 2 tabs 3/day to see if helps her tremor and overall function  She continues to provide for her 3 daughters who are living with her    Tanja met with her and began discussions about treatment options including deep brain stimulation along with the sinemet dose increase. Given her age and the severity of tremor, dbs would be an excellent option if and when Marta thinks the risks are worth the potential benefits, ie unilateral left GPI or left STN DBS    I started this discussion with patient and will need ongoing review at the upcoming visit in 3-6 months with me or/and  Blanche Alvarado NP    Coding statement:   Medical Decision Making:  #  Chronic progressive medical conditions addressed  - see above --   Review and/or interpretation of unique test or documentation from a provider outside of neurology no   Independent historian provided additional details  Yes  daughter  Prescription drug management and review of potential side effects and/or monitoring for side effects  -- see above ---  Health impacted by social determinants of health  Yes - , living    I have reviewed the note as documented above.  This accurately captures the substance of my conversation with the patient and total time spent preparing for visit, executing visit and completing visit on the day of the visit:  30 minutes.  The portion of this total time included face to face time 215pm - 245pm    Magdaleno Manzano MD      ______________________________________    Last visit date and details:             ______________________________________      Patient was asked about 14 Review of systems including changes in vision (dry eyes, double vision), hearing, heart, lungs, musculoskeletal, depression, anxiety, snoring, RBD, insomnia, urinary frequency, urinary urgency, constipation, swallowing problems, hematological, ID, allergies, skin problems: seborrhea, endocrinological: thyroid, diabetes, cholesterol; balance, weight changes, and other neurological problems and these were not significant at this time except for There is no problem list on file for this patient.       No Known Allergies  No past surgical history on file.  No past medical history on file.  Social History     Socioeconomic History     Marital status: Single     Spouse name: Not on file     Number of children: Not on file     Years of education: Not on file     Highest education level: Not on file   Occupational History     Not on file   Tobacco Use     Smoking status: Never Smoker     Smokeless tobacco: Never Used   Substance and Sexual Activity     Alcohol use: Not Currently     Drug use: Not on file     Sexual activity: Not on file   Other Topics Concern     Not on file   Social History Narrative     Not on file     Social Determinants of Health     Financial Resource Strain: Not on file   Food Insecurity: Not on file   Transportation Needs: Not on file   Physical Activity: Not on file   Stress: Not on file   Social Connections: Not on file   Intimate Partner Violence: Not on file   Housing Stability: Not on file       Drug and lactation database from the [place] National Library of Medicine:  http://toxnet.nlm.nih.gov/cgi-bin/sis/htmlgen?LACT      B/P: Data Unavailable, T: Data Unavailable, P: Data Unavailable, R: Data Unavailable 0 lbs 0 oz  There were no vitals taken for this visit., There is no height or weight on file to calculate BMI.  Medications and  Vitals not listed above were documented in the cart and reviewed by me.     Current Outpatient Medications   Medication Sig Dispense Refill     carbidopa-levodopa (SINEMET)  MG tablet Take 1.5 tablets three times a day, 1/2 to 1 hour before meals 405 tablet 3     ibuprofen (ADVIL/MOTRIN) 200 MG capsule Take 200 mg by mouth every 6 hours as needed for pain        levothyroxine (SYNTHROID/LEVOTHROID) 125 MCG tablet Take 125 mcg by mouth daily       trihexyphenidyl (ARTANE) 2 MG tablet Take one-half tablet (1 mg) by mouth once daily for 5 days, then increase to one-half tablet (1 mg) twice daily for 5 days, then increase to 1 tablet (2 mg) twice daily for 5 days, then increase to 1 tablet (2 mg) 3 times/day thereafter 90 tablet 11         Magdaleno Manzano MD

## 2022-05-13 ENCOUNTER — OFFICE VISIT (OUTPATIENT)
Dept: NEUROLOGY | Facility: CLINIC | Age: 52
End: 2022-05-13
Payer: COMMERCIAL

## 2022-05-13 VITALS
SYSTOLIC BLOOD PRESSURE: 133 MMHG | DIASTOLIC BLOOD PRESSURE: 87 MMHG | BODY MASS INDEX: 29.09 KG/M2 | HEART RATE: 81 BPM | WEIGHT: 138 LBS | OXYGEN SATURATION: 97 %

## 2022-05-13 DIAGNOSIS — G20.A1 PARKINSON DISEASE (H): ICD-10-CM

## 2022-05-13 DIAGNOSIS — G20.A1 PARKINSON DISEASE (H): Primary | ICD-10-CM

## 2022-05-13 PROCEDURE — 99213 OFFICE O/P EST LOW 20 MIN: CPT | Performed by: PSYCHIATRY & NEUROLOGY

## 2022-05-13 RX ORDER — CARBIDOPA AND LEVODOPA 25; 100 MG/1; MG/1
TABLET ORAL
Qty: 540 TABLET | Refills: 3 | Status: SHIPPED | OUTPATIENT
Start: 2022-05-13 | End: 2023-05-09

## 2022-05-13 RX ORDER — CARBIDOPA AND LEVODOPA 25; 100 MG/1; MG/1
TABLET, ORALLY DISINTEGRATING ORAL
COMMUNITY
Start: 2022-03-04 | End: 2022-05-13

## 2022-05-13 RX ORDER — LEVOTHYROXINE SODIUM 112 UG/1
TABLET ORAL
COMMUNITY
Start: 2022-03-03 | End: 2022-05-13

## 2022-05-13 NOTE — LETTER
2022       RE: Marta Osorio  [address]  [place] [address]     Dear Colleague,    Thank you for referring your patient, Marta Osorio, to the [facility] [place] at [facility]. Please see a copy of my visit note below.        Diagnosis/Summary/Recommendations:    PATIENT: Marta Osorio  52 year old female     : 1970    LEONARDO: May 13, 2022    MRN: [id number]    [address]   [place] [address]  [email or web address]      [phone] (H)      Has 3 daughters: 12, 17 and 18   From [place]      Assessment:  (G20) Parkinson disease (H)  (primary encounter diagnosis)  Carbidopa/levodopa Sinemet 25/100  Trihexyphenidyl artane 2mg      Reviewed history, started having R hand tremors that started a few years ago. Diagnosed with mild idiopathic Parkinson's disease by Dr Alnoso. Prescribed carbidopa/levodopa 25/100, 1.5 tabs TID.  No gait issues or falls.  No  Progression since visit, has not followed up due to COVID pandemic. Continues to have the tremors and does not feel like the carbidopa/levodopa is helping with this. No side effects from the medication.     Review of diagnosis    Parkinson disease    Avoidance of dopamine blockers   Not taking    Motor complication review       Review of Impulse control disorders       Review of surgical or medication options       Gait/Balance/Falls   Not falling    Exercise/Therapy performed/offered   Working and walking a lot  [place] offices in downtown    Cognitive/Driving   Not driving    Mood   Has been tearful in the past about diagnosis  Mood stable   Living with 3 daughters     Hallucinations/delusions   no    Sleep   difficulty falling asleep at night, gets about 6-7 hours, no naps during the day, no dream enactment  1030/11pm -= 7 am  8 hours - Monday through friday    Bladder/Renal/Prostate/Gyn/Other  No urgency      GI/Constipation/GERD   Used to be on omeprazole, but no longer on that      ENDO/Lipid/DM/Bone density/Thyroid  Levothyroxine synthroid/levothyroid 125mcg    Cardio/heart/Hyper or Hypotensive   No problems    Vision/Dry Eyes/Cataracts/Glaucoma/Macular   No problems  Wears glasses    Heme/Anticoagulation/Antiplatelet/Anemia/Other  no    ENT/Resp  No problems    Skin/Cancer/Seborrhea/other      Musculoskeletal/Pain/Headache  Ibuprofen advil/motrin 200mg as needed    Other:        Medications     0800 1400 2000     Carbidopa/levodopa Sinemet 25/100 1.5 1.5 1.5     Ibuprofen advil/motrin 200mg prn         Levothyroxine synthroid/levothyroid 125mcg 1         Trihexyphenidyl artane 2mg  no                                                        Plan:    Will increase her sinemet to 2 tabs 3/day to see if helps her tremor and overall function  She continues to provide for her 3 daughters who are living with her    Tanja met with her and began discussions about treatment options including deep brain stimulation along with the sinemet dose increase. Given her age and the severity of tremor, dbs would be an excellent option if and when Marta thinks the risks are worth the potential benefits, ie unilateral left GPI or left STN DBS    I started this discussion with patient and will need ongoing review at the upcoming visit in 3-6 months with me or/and  Blanche Alvarado NP    Coding statement:   Medical Decision Making:  #  Chronic progressive medical conditions addressed  - see above --   Review and/or interpretation of unique test or documentation from a provider outside of neurology no   Independent historian provided additional details  Yes  daughter  Prescription drug management and review of potential side effects and/or monitoring for side effects  -- see above ---  Health impacted by social determinants of health  Yes - , living    I have reviewed the note as documented above.  This accurately captures the  substance of my conversation with the patient and total time spent preparing for visit, executing visit and completing visit on the day of the visit:  30 minutes.  The portion of this total time included face to face time 215pm - 245pm    Magdaleno Manzano MD     ______________________________________    Last visit date and details:             ______________________________________      Patient was asked about 14 Review of systems including changes in vision (dry eyes, double vision), hearing, heart, lungs, musculoskeletal, depression, anxiety, snoring, RBD, insomnia, urinary frequency, urinary urgency, constipation, swallowing problems, hematological, ID, allergies, skin problems: seborrhea, endocrinological: thyroid, diabetes, cholesterol; balance, weight changes, and other neurological problems and these were not significant at this time except for There is no problem list on file for this patient.       No Known Allergies  No past surgical history on file.  No past medical history on file.  Social History     Socioeconomic History     Marital status: Single     Spouse name: Not on file     Number of children: Not on file     Years of education: Not on file     Highest education level: Not on file   Occupational History     Not on file   Tobacco Use     Smoking status: Never Smoker     Smokeless tobacco: Never Used   Substance and Sexual Activity     Alcohol use: Not Currently     Drug use: Not on file     Sexual activity: Not on file   Other Topics Concern     Not on file   Social History Narrative     Not on file     Social Determinants of Health     Financial Resource Strain: Not on file   Food Insecurity: Not on file   Transportation Needs: Not on file   Physical Activity: Not on file   Stress: Not on file   Social Connections: Not on file   Intimate Partner Violence: Not on file   Housing Stability: Not on file       Drug and lactation database from the [place] National Library of  Medicine:  http://toxnet.nlm.nih.gov/cgi-bin/sis/htmlgen?LACT      B/P: Data Unavailable, T: Data Unavailable, P: Data Unavailable, R: Data Unavailable 0 lbs 0 oz  There were no vitals taken for this visit., There is no height or weight on file to calculate BMI.  Medications and Vitals not listed above were documented in the cart and reviewed by me.     Current Outpatient Medications   Medication Sig Dispense Refill     carbidopa-levodopa (SINEMET)  MG tablet Take 1.5 tablets three times a day, 1/2 to 1 hour before meals 405 tablet 3     ibuprofen (ADVIL/MOTRIN) 200 MG capsule Take 200 mg by mouth every 6 hours as needed for pain        levothyroxine (SYNTHROID/LEVOTHROID) 125 MCG tablet Take 125 mcg by mouth daily       trihexyphenidyl (ARTANE) 2 MG tablet Take one-half tablet (1 mg) by mouth once daily for 5 days, then increase to one-half tablet (1 mg) twice daily for 5 days, then increase to 1 tablet (2 mg) twice daily for 5 days, then increase to 1 tablet (2 mg) 3 times/day thereafter 90 tablet 11         Magdaleno Manzano MD

## 2022-05-13 NOTE — NURSING NOTE
Met with patient and daughter in clinic. Used translater pad.    Discussed going up to 2 tabs carbidopa/levodopa  three times per day and the importance of noting what symptoms or side effects she may experience IN RELATION to when she takes her pills.    She asked if there was something else we could do to calm down her tremors. I asked what she meant - sedation? Or relaxant? She said sedation. I explained that we don't prescribe that type of medication for Parkinson's disease tremor because patients become tolerant to it and the dose just keeps increasing and increasing and before long there is an addiction    Discussed Deep Brain Stimulation and how that helps even when the Parkinson's disease medications don't. Patient must be at least 4 years into their disease and tried medication management first. This is why we need her note how she feels throughout the day IN RELATION to when she takes her Parkinson's disease medications.    Patient has not gone on-line to learn about Parkinson's disease. I pulled up the apdaparkinson.org website and showed her how to navigate to resources and publications. They have many resources in [place]. When I asked if she had questions about:    Constipation - no  Thinking issues - no  Talking to kids about Parkinson's disease - no  Medication side effects and info - no    I believe she was extremely tired from the visit. I saw her yawn many times and her attention wane. I printed a document about medication fluctuations in [place]an and gave it to her. Kenn Scott came and checked her out and scheduled a visit in 6 months.    She will call me in 2 weeks to let me know how she is doing.

## 2022-06-02 ENCOUNTER — TELEPHONE (OUTPATIENT)
Dept: NEUROLOGY | Facility: CLINIC | Age: 52
End: 2022-06-02
Payer: COMMERCIAL

## 2022-06-02 NOTE — TELEPHONE ENCOUNTER
How is it going with the increased dose of carbidopa/levodopa 2 tabs three times per day?    Used  #[id number]. Left voice mail.

## 2022-06-09 NOTE — TELEPHONE ENCOUNTER
[facility] Call Center    Phone Message    May a detailed message be left on voicemail: yes     Reason for Call: Other: Pt called with  to report that the increase of her medication to 2 per day is going well. She is tolerating it well.      No need to call back unless further information is needed.    Action Taken: Message routed to:  [facility] ([facility] Neurology    Travel Screening: Not Applicable

## 2022-06-13 NOTE — TELEPHONE ENCOUNTER
number: [id number] -  used for this call    Informed Marta SANG will be mailing resources in South Korean including instructions on how to change the APDA web site into South Korean. She does have someone who can help her if needed.

## 2022-07-16 ENCOUNTER — HEALTH MAINTENANCE LETTER (OUTPATIENT)
Age: 52
End: 2022-07-16

## 2022-09-18 ENCOUNTER — HEALTH MAINTENANCE LETTER (OUTPATIENT)
Age: 52
End: 2022-09-18

## 2022-11-03 ENCOUNTER — TELEPHONE (OUTPATIENT)
Dept: NEUROLOGY | Facility: CLINIC | Age: 52
End: 2022-11-03

## 2022-11-03 NOTE — TELEPHONE ENCOUNTER
carbidopa-levodopa (SINEMET)  MG tablet 540 tablet 3 5/13/2022  No   Sig: Take 2 tablets three times a day, 1/2 to 1 hour before meals   Sent to pharmacy as: Carbidopa-Levodopa  MG Oral Tablet (SINEMET)   Class: E-Prescribe   Notes to Pharmacy: Dose increase   Order: [id number]   E-Prescribing Status: Receipt confirmed by pharmacy (5/13/2022  3:15 PM CDT)     Patient received a year's worth of refills back on 5/13/22. Called patient to let her know this but could not communicate in English.    Called patient using .  #[phone] Kam. Left voice mail to call back with a good time to reach her.

## 2023-05-03 NOTE — PROGRESS NOTES
Diagnosis/Summary/Recommendations:    PATIENT: Marta Osorio  53 year old female     : 1970    LEONARDO: May 9, 2023    MRN: [id number]  [address]   [place] [address]  [email or web address]      [phone] (H)      Has 3 daughters: 12, 17 and 18   From [place]     Will increase her sinemet to 2 tabs 3/day to see if helps her tremor and overall function  She continues to provide for her 3 daughters who are living with her      Assessment:  (G20) Parkinson disease (H)  (primary encounter diagnosis)  Carbidopa/levodopa Sinemet 25/100  Trihexyphenidyl artane 2mg       Reviewed history, started having R hand tremors that started a few years ago. Diagnosed with mild idiopathic Parkinson's disease by Dr Alonso. Prescribed carbidopa/levodopa 25/100, 1.5 tabs TID.  No gait issues or falls.  No  Progression since visit, has not followed up due to COVID pandemic. Continues to have the tremors and does not feel like the carbidopa/levodopa is helping with this. No side effects from the medication.      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     Cognitive/Driving   Not driving     Mood   Has been tearful in the past about diagnosis  Mood stable   Living with 3 daughters      Hallucinations/delusions   no     Sleep   difficulty falling asleep at night, gets about 6-7 hours, no naps during the day, no dream enactment  1030/11pm -= 7 am  8 hours - Monday through friday     Bladder/Renal/Prostate/Gyn/Other  No urgency      GI/Constipation/GERD   Used to be on omeprazole, but no longer on that        ENDO/Lipid/DM/Bone density/Thyroid  Levothyroxine synthroid/levothyroid  125mcg     Cardio/heart/Hyper or Hypotensive   No problems     Vision/Dry Eyes/Cataracts/Glaucoma/Macular   No problems  Wears glasses     Heme/Anticoagulation/Antiplatelet/Anemia/Other  no     ENT/Resp  No problems     Skin/Cancer/Seborrhea/other        Musculoskeletal/Pain/Headache  Ibuprofen advil/motrin 200mg as needed     Other:           Medications     0800 1400 2000     Carbidopa/levodopa Sinemet 25/100 2 2 2     Levothyroxine synthroid/levothyroid 125mcg off         Levothyroxine synthroid/levothyroid 112mcg 1                                                                   Plan:    She has noticed more tremor on the right side and not having significant side effects from the medication  Emotion has increased her tremor.     Has right arm pain     Return in 6 months or 12 months    She is okay to talk with other patient from [place]    Will increase sinemet 2.5 3/day  Propranolol 10mg as needed         Coding statement:   Medical Decision Making:  #  Chronic progressive medical conditions addressed  - see above --   Review and/or interpretation of unique test or documentation from a provider outside of neurology no   Independent historian provided additional details   - Sarah morris  Prescription drug management and review of potential side effects and/or monitoring for side effects  -- see above ---  Health impacted by social determinants of health  no    I have reviewed the note as documented above.  This accurately captures the substance of my conversation with the patient and total time spent preparing for visit, executing visit and completing visit on the day of the visit:  30 minutes.  The portion of this total time included face to face time 24 minutes    Magdaleno Manzano MD     ______________________________________    Last visit date and details:             ______________________________________      Patient was asked about 14 Review of systems including changes in vision (dry eyes, double  vision), hearing, heart, lungs, musculoskeletal, depression, anxiety, snoring, RBD, insomnia, urinary frequency, urinary urgency, constipation, swallowing problems, hematological, ID, allergies, skin problems: seborrhea, endocrinological: thyroid, diabetes, cholesterol; balance, weight changes, and other neurological problems and these were not significant at this time except for There is no problem list on file for this patient.         Allergies   Allergen Reactions     Trihexyphenidyl      Dizziness       No past surgical history on file.  No past medical history on file.  Social History     Socioeconomic History     Marital status: Single     Spouse name: Not on file     Number of children: Not on file     Years of education: Not on file     Highest education level: Not on file   Occupational History     Not on file   Tobacco Use     Smoking status: Never     Smokeless tobacco: Never   Vaping Use     Vaping status: Not on file   Substance and Sexual Activity     Alcohol use: Not Currently     Drug use: Not on file     Sexual activity: Not on file   Other Topics Concern     Not on file   Social History Narrative     Not on file     Social Determinants of Health     Financial Resource Strain: Not on file   Food Insecurity: Not on file   Transportation Needs: Not on file   Physical Activity: Not on file   Stress: Not on file   Social Connections: Not on file   Intimate Partner Violence: Not on file   Housing Stability: Not on file       Drug and lactation database from the [place] National Library of Medicine:  http://toxnet.nlm.nih.gov/cgi-bin/sis/htmlgen?LACT      B/P: Data Unavailable, T: Data Unavailable, P: Data Unavailable, R: Data Unavailable 0 lbs 0 oz  There were no vitals taken for this visit., There is no height or weight on file to calculate BMI.  Medications and Vitals not listed above were documented in the cart and reviewed by me.     Current Outpatient Medications   Medication Sig Dispense  Refill     carbidopa-levodopa (SINEMET)  MG tablet Take 2 tablets three times a day, 1/2 to 1 hour before meals 540 tablet 3     levothyroxine (SYNTHROID/LEVOTHROID) 112 MCG tablet Take 112 mcg by mouth every morning       levothyroxine (SYNTHROID/LEVOTHROID) 125 MCG tablet Take 125 mcg by mouth daily           Magdaleno Manzano MD

## 2023-05-09 ENCOUNTER — OFFICE VISIT (OUTPATIENT)
Dept: NEUROLOGY | Facility: CLINIC | Age: 53
End: 2023-05-09
Payer: COMMERCIAL

## 2023-05-09 VITALS
SYSTOLIC BLOOD PRESSURE: 114 MMHG | OXYGEN SATURATION: 99 % | DIASTOLIC BLOOD PRESSURE: 71 MMHG | WEIGHT: 132.6 LBS | HEART RATE: 69 BPM | BODY MASS INDEX: 27.95 KG/M2

## 2023-05-09 DIAGNOSIS — G20.A1 PARKINSON DISEASE (H): Primary | ICD-10-CM

## 2023-05-09 PROCEDURE — 99214 OFFICE O/P EST MOD 30 MIN: CPT | Performed by: PSYCHIATRY & NEUROLOGY

## 2023-05-09 RX ORDER — PROPRANOLOL HYDROCHLORIDE 10 MG/1
10 TABLET ORAL 3 TIMES DAILY PRN
Qty: 270 TABLET | Refills: 3 | Status: SHIPPED | OUTPATIENT
Start: 2023-05-09

## 2023-05-09 RX ORDER — CARBIDOPA AND LEVODOPA 25; 100 MG/1; MG/1
TABLET ORAL
Qty: 675 TABLET | Refills: 3 | Status: SHIPPED | OUTPATIENT
Start: 2023-05-09

## 2023-05-09 ASSESSMENT — PAIN SCALES - GENERAL: PAINLEVEL: NO PAIN (0)

## 2023-05-09 NOTE — LETTER
2023       RE: Marta Osorio  [address]  [place] [address]     Dear Colleague,    Thank you for referring your patient, Marta Osorio, to the [facility] [place] at [facility]. Please see a copy of my visit note below.        Diagnosis/Summary/Recommendations:    PATIENT: Marta Osorio  53 year old female     : 1970    LEONARDO: May 9, 2023    MRN: [id number]  [address]   [place] [address]  [email or web address]      [phone] (H)      Has 3 daughters: 12, 17 and 18   From [place]     Will increase her sinemet to 2 tabs 3/day to see if helps her tremor and overall function  She continues to provide for her 3 daughters who are living with her      Assessment:  (G20) Parkinson disease (H)  (primary encounter diagnosis)  Carbidopa/levodopa Sinemet 25/100  Trihexyphenidyl artane 2mg       Reviewed history, started having R hand tremors that started a few years ago. Diagnosed with mild idiopathic Parkinson's disease by Dr Alonso. Prescribed carbidopa/levodopa 25/100, 1.5 tabs TID.  No gait issues or falls.  No  Progression since visit, has not followed up due to COVID pandemic. Continues to have the tremors and does not feel like the carbidopa/levodopa is helping with this. No side effects from the medication.      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     Cognitive/Driving   Not driving     Mood   Has been tearful in the past about diagnosis  Mood stable   Living with 3 daughters      Hallucinations/delusions   no     Sleep   difficulty falling asleep  at night, gets about 6-7 hours, no naps during the day, no dream enactment  1030/11pm -= 7 am  8 hours - Monday through friday     Bladder/Renal/Prostate/Gyn/Other  No urgency      GI/Constipation/GERD   Used to be on omeprazole, but no longer on that        ENDO/Lipid/DM/Bone density/Thyroid  Levothyroxine synthroid/levothyroid 125mcg     Cardio/heart/Hyper or Hypotensive   No problems     Vision/Dry Eyes/Cataracts/Glaucoma/Macular   No problems  Wears glasses     Heme/Anticoagulation/Antiplatelet/Anemia/Other  no     ENT/Resp  No problems     Skin/Cancer/Seborrhea/other        Musculoskeletal/Pain/Headache  Ibuprofen advil/motrin 200mg as needed     Other:           Medications     0800 1400 2000     Carbidopa/levodopa Sinemet 25/100 2 2 2     Levothyroxine synthroid/levothyroid 125mcg off         Levothyroxine synthroid/levothyroid 112mcg 1                                                                   Plan:    She has noticed more tremor on the right side and not having significant side effects from the medication  Emotion has increased her tremor.     Has right arm pain     Return in 6 months or 12 months    She is okay to talk with other patient from [place]    Will increase sinemet 2.5 3/day  Propranolol 10mg as needed         Coding statement:   Medical Decision Making:  #  Chronic progressive medical conditions addressed  - see above --   Review and/or interpretation of unique test or documentation from a provider outside of neurology no   Independent historian provided additional details   - Sarah morris  Prescription drug management and review of potential side effects and/or monitoring for side effects  -- see above ---  Health impacted by social determinants of health  no    I have reviewed the note as documented above.  This accurately captures the substance of my conversation with the patient and total time spent preparing for visit, executing visit and completing visit on the day of the  visit:  30 minutes.  The portion of this total time included face to face time 24 minutes    Magdaleno Manzano MD     ______________________________________    Last visit date and details:             ______________________________________      Patient was asked about 14 Review of systems including changes in vision (dry eyes, double vision), hearing, heart, lungs, musculoskeletal, depression, anxiety, snoring, RBD, insomnia, urinary frequency, urinary urgency, constipation, swallowing problems, hematological, ID, allergies, skin problems: seborrhea, endocrinological: thyroid, diabetes, cholesterol; balance, weight changes, and other neurological problems and these were not significant at this time except for There is no problem list on file for this patient.         Allergies   Allergen Reactions     Trihexyphenidyl      Dizziness       No past surgical history on file.  No past medical history on file.  Social History     Socioeconomic History     Marital status: Single     Spouse name: Not on file     Number of children: Not on file     Years of education: Not on file     Highest education level: Not on file   Occupational History     Not on file   Tobacco Use     Smoking status: Never     Smokeless tobacco: Never   Vaping Use     Vaping status: Not on file   Substance and Sexual Activity     Alcohol use: Not Currently     Drug use: Not on file     Sexual activity: Not on file   Other Topics Concern     Not on file   Social History Narrative     Not on file     Social Determinants of Health     Financial Resource Strain: Not on file   Food Insecurity: Not on file   Transportation Needs: Not on file   Physical Activity: Not on file   Stress: Not on file   Social Connections: Not on file   Intimate Partner Violence: Not on file   Housing Stability: Not on file       Drug and lactation database from the [place] National Library of Medicine:  http://toxnet.nlm.nih.gov/cgi-bin/sis/htmlgen?LACT      B/P: Data  Unavailable, T: Data Unavailable, P: Data Unavailable, R: Data Unavailable 0 lbs 0 oz  There were no vitals taken for this visit., There is no height or weight on file to calculate BMI.  Medications and Vitals not listed above were documented in the cart and reviewed by me.     Current Outpatient Medications   Medication Sig Dispense Refill     carbidopa-levodopa (SINEMET)  MG tablet Take 2 tablets three times a day, 1/2 to 1 hour before meals 540 tablet 3     levothyroxine (SYNTHROID/LEVOTHROID) 112 MCG tablet Take 112 mcg by mouth every morning       levothyroxine (SYNTHROID/LEVOTHROID) 125 MCG tablet Take 125 mcg by mouth daily           Magdaleno Manzano MD

## 2023-05-09 NOTE — PATIENT INSTRUCTIONS
She has noticed more tremor on the right side and not having significant side effects from the medication  Emotion has increased her tremor.     Has right arm pain     Return in 6 months or 12 months    She is okay to talk with other patient from [place]    Will increase sinemet 2.5 3/day  Propranolol 10mg as needed

## 2023-05-09 NOTE — NURSING NOTE
She is not sure carbidopa/levodopa is working well for her. She has more tremor in her right hand. She notes some stiffness in her right hand also. She does not notice carbidopa/levodopa helping the tremor and stiffness when she takes it. She is not sure if she notices more symptoms when her next dose is do, just that her hand moves a lot. Tremors are worse when she is stressed or angry. She cannot say if there is a certain time of day tremors are worse. She works full time.    Takes carbidopa/levodopa 2 tablets at 8AM, 2PM, and 8PM     She would like Bengali resources for Parkinson's disease sent to her on [employer]. Per discussion with Dr. Manzano she asked that we provide her number to another patient of Dr. Manzano's who also speaks Bengali.   Where Do You Want The Question To Include Opioid Counseling Located?: Case Summary Tab

## 2023-07-30 ENCOUNTER — HEALTH MAINTENANCE LETTER (OUTPATIENT)
Age: 53
End: 2023-07-30

## 2024-04-11 PROBLEM — G20.A1 PARKINSON'S DISEASE, UNSPECIFIED WHETHER DYSKINESIA PRESENT, UNSPECIFIED WHETHER MANIFESTATIONS FLUCTUATE (H): Status: ACTIVE | Noted: 2024-04-11

## 2024-09-22 ENCOUNTER — HEALTH MAINTENANCE LETTER (OUTPATIENT)
Age: 54
End: 2024-09-22

## 2025-03-27 ENCOUNTER — ANCILLARY PROCEDURE (OUTPATIENT)
Dept: GENERAL RADIOLOGY | Facility: CLINIC | Age: 55
End: 2025-03-27
Attending: NURSE PRACTITIONER
Payer: COMMERCIAL

## 2025-03-27 ENCOUNTER — ORDERS ONLY (AUTO-RELEASED) (OUTPATIENT)
Dept: FAMILY MEDICINE | Facility: CLINIC | Age: 55
End: 2025-03-27

## 2025-03-27 ENCOUNTER — OFFICE VISIT (OUTPATIENT)
Dept: FAMILY MEDICINE | Facility: CLINIC | Age: 55
End: 2025-03-27
Payer: COMMERCIAL

## 2025-03-27 VITALS
HEIGHT: 58 IN | BODY MASS INDEX: 29.24 KG/M2 | SYSTOLIC BLOOD PRESSURE: 108 MMHG | RESPIRATION RATE: 13 BRPM | TEMPERATURE: 97.5 F | DIASTOLIC BLOOD PRESSURE: 70 MMHG | OXYGEN SATURATION: 100 % | WEIGHT: 139.3 LBS | HEART RATE: 69 BPM

## 2025-03-27 DIAGNOSIS — R06.09 DYSPNEA ON EXERTION: ICD-10-CM

## 2025-03-27 DIAGNOSIS — Z13.220 LIPID SCREENING: ICD-10-CM

## 2025-03-27 DIAGNOSIS — Z11.59 NEED FOR HEPATITIS C SCREENING TEST: ICD-10-CM

## 2025-03-27 DIAGNOSIS — B96.89 BACTERIAL VAGINOSIS: ICD-10-CM

## 2025-03-27 DIAGNOSIS — M25.552 HIP PAIN, LEFT: ICD-10-CM

## 2025-03-27 DIAGNOSIS — E03.9 HYPOTHYROIDISM, UNSPECIFIED TYPE: ICD-10-CM

## 2025-03-27 DIAGNOSIS — Z12.11 SCREEN FOR COLON CANCER: ICD-10-CM

## 2025-03-27 DIAGNOSIS — N89.8 VAGINAL DISCHARGE: ICD-10-CM

## 2025-03-27 DIAGNOSIS — Z12.31 VISIT FOR SCREENING MAMMOGRAM: Primary | ICD-10-CM

## 2025-03-27 DIAGNOSIS — R93.89 ABNORMAL CHEST X-RAY: ICD-10-CM

## 2025-03-27 DIAGNOSIS — G20.A1 PARKINSON'S DISEASE, UNSPECIFIED WHETHER DYSKINESIA PRESENT, UNSPECIFIED WHETHER MANIFESTATIONS FLUCTUATE (H): ICD-10-CM

## 2025-03-27 DIAGNOSIS — Z12.31 VISIT FOR SCREENING MAMMOGRAM: ICD-10-CM

## 2025-03-27 DIAGNOSIS — Z76.89 ENCOUNTER TO ESTABLISH CARE: ICD-10-CM

## 2025-03-27 DIAGNOSIS — N76.0 BACTERIAL VAGINOSIS: ICD-10-CM

## 2025-03-27 DIAGNOSIS — Z12.4 CERVICAL CANCER SCREENING: ICD-10-CM

## 2025-03-27 DIAGNOSIS — Z11.4 SCREENING FOR HIV (HUMAN IMMUNODEFICIENCY VIRUS): ICD-10-CM

## 2025-03-27 DIAGNOSIS — Z76.89 ENCOUNTER TO ESTABLISH CARE: Primary | ICD-10-CM

## 2025-03-27 LAB
ALBUMIN SERPL BCG-MCNC: 4.1 G/DL (ref 3.5–5.2)
ALP SERPL-CCNC: 112 U/L (ref 40–150)
ALT SERPL W P-5'-P-CCNC: 6 U/L (ref 0–50)
ANION GAP SERPL CALCULATED.3IONS-SCNC: 10 MMOL/L (ref 7–15)
AST SERPL W P-5'-P-CCNC: 22 U/L (ref 0–45)
BILIRUB SERPL-MCNC: 0.2 MG/DL
BUN SERPL-MCNC: 14.9 MG/DL (ref 6–20)
CALCIUM SERPL-MCNC: 9.1 MG/DL (ref 8.8–10.4)
CHLORIDE SERPL-SCNC: 106 MMOL/L (ref 98–107)
CHOLEST SERPL-MCNC: 208 MG/DL
CLUE CELLS: PRESENT
CREAT SERPL-MCNC: 0.42 MG/DL (ref 0.51–0.95)
EGFRCR SERPLBLD CKD-EPI 2021: >90 ML/MIN/1.73M2
ERYTHROCYTE [DISTWIDTH] IN BLOOD BY AUTOMATED COUNT: 12.4 % (ref 10–15)
FASTING STATUS PATIENT QL REPORTED: ABNORMAL
FASTING STATUS PATIENT QL REPORTED: ABNORMAL
GLUCOSE SERPL-MCNC: 90 MG/DL (ref 70–99)
HCO3 SERPL-SCNC: 25 MMOL/L (ref 22–29)
HCT VFR BLD AUTO: 36.6 % (ref 35–47)
HDLC SERPL-MCNC: 64 MG/DL
HGB BLD-MCNC: 12.1 G/DL (ref 11.7–15.7)
LDLC SERPL CALC-MCNC: 127 MG/DL
MCH RBC QN AUTO: 28.8 PG (ref 26.5–33)
MCHC RBC AUTO-ENTMCNC: 33.1 G/DL (ref 31.5–36.5)
MCV RBC AUTO: 87 FL (ref 78–100)
NONHDLC SERPL-MCNC: 144 MG/DL
PLATELET # BLD AUTO: 197 10E3/UL (ref 150–450)
POTASSIUM SERPL-SCNC: 4 MMOL/L (ref 3.4–5.3)
PROT SERPL-MCNC: 7.4 G/DL (ref 6.4–8.3)
RBC # BLD AUTO: 4.2 10E6/UL (ref 3.8–5.2)
SODIUM SERPL-SCNC: 141 MMOL/L (ref 135–145)
TRICHOMONAS, WET PREP: ABNORMAL
TRIGL SERPL-MCNC: 83 MG/DL
TSH SERPL DL<=0.005 MIU/L-ACNC: 0.93 UIU/ML (ref 0.3–4.2)
WBC # BLD AUTO: 7.1 10E3/UL (ref 4–11)
WBC'S/HIGH POWER FIELD, WET PREP: ABNORMAL
YEAST, WET PREP: ABNORMAL

## 2025-03-27 PROCEDURE — 80061 LIPID PANEL: CPT | Performed by: NURSE PRACTITIONER

## 2025-03-27 PROCEDURE — 1125F AMNT PAIN NOTED PAIN PRSNT: CPT | Performed by: NURSE PRACTITIONER

## 2025-03-27 PROCEDURE — 99204 OFFICE O/P NEW MOD 45 MIN: CPT | Performed by: NURSE PRACTITIONER

## 2025-03-27 PROCEDURE — G2211 COMPLEX E/M VISIT ADD ON: HCPCS | Performed by: NURSE PRACTITIONER

## 2025-03-27 PROCEDURE — 3078F DIAST BP <80 MM HG: CPT | Performed by: NURSE PRACTITIONER

## 2025-03-27 PROCEDURE — 84443 ASSAY THYROID STIM HORMONE: CPT | Performed by: NURSE PRACTITIONER

## 2025-03-27 PROCEDURE — 85027 COMPLETE CBC AUTOMATED: CPT | Performed by: NURSE PRACTITIONER

## 2025-03-27 PROCEDURE — 3074F SYST BP LT 130 MM HG: CPT | Performed by: NURSE PRACTITIONER

## 2025-03-27 PROCEDURE — 80053 COMPREHEN METABOLIC PANEL: CPT | Performed by: NURSE PRACTITIONER

## 2025-03-27 PROCEDURE — 87210 SMEAR WET MOUNT SALINE/INK: CPT | Performed by: NURSE PRACTITIONER

## 2025-03-27 PROCEDURE — 36415 COLL VENOUS BLD VENIPUNCTURE: CPT | Performed by: NURSE PRACTITIONER

## 2025-03-27 PROCEDURE — 87591 N.GONORRHOEAE DNA AMP PROB: CPT | Performed by: NURSE PRACTITIONER

## 2025-03-27 PROCEDURE — 87491 CHLMYD TRACH DNA AMP PROBE: CPT | Performed by: NURSE PRACTITIONER

## 2025-03-27 RX ORDER — ALBUTEROL SULFATE 90 UG/1
2 INHALANT RESPIRATORY (INHALATION) EVERY 4 HOURS PRN
Qty: 18 G | Refills: 0 | Status: SHIPPED | OUTPATIENT
Start: 2025-03-27

## 2025-03-27 ASSESSMENT — PAIN SCALES - GENERAL: PAINLEVEL_OUTOF10: MILD PAIN (2)

## 2025-03-27 ASSESSMENT — ENCOUNTER SYMPTOMS: SHORTNESS OF BREATH: 1

## 2025-03-27 NOTE — PROGRESS NOTES
"  Assessment & Plan     Visit for screening mammogram  ***  - MA Screening Bilateral w/ Zay    Screen for colon cancer  ***  - COLOGUARD([employer])    Screening for HIV (human immunodeficiency virus)  ***    Need for hepatitis C screening test  ***    Cervical cancer screening  ***    Hypothyroidism, unspecified type  ***  - TSH WITH FREE T4 REFLEX  - CBC with platelets  - Comprehensive metabolic panel    Lipid screening  ***  - Lipid panel reflex to direct LDL Non-fasting    Dyspnea on exertion  ***  - General PFT Lab (Please always keep checked)  - Pulmonary Function Test  - XR Chest 2 Views  - albuterol (PROAIR HFA/PROVENTIL HFA/VENTOLIN HFA) 108 (90 Base) MCG/ACT inhaler  Dispense: 18 g; Refill: 0    Parkinson's disease, unspecified whether dyskinesia present, unspecified whether manifestations fluctuate (H)  ***  - Adult Neurology  Referral    Vaginal discharge  ***  - Wet prep - Clinic Collect  - Chlamydia trachomatis/Neisseria gonorrhoeae by PCR    Encounter to establish care  ***            BMI  Estimated body mass index is 29.36 kg/m  as calculated from the following:    Height as of this encounter: 1.467 m (4' 9.76\").    Weight as of this encounter: 63.2 kg (139 lb 4.8 oz).   {Weight Management Plan needed for ACO:396135}      {FOLLOW UP PLANS (Optional) Includes COVID19 Treatment Plan:076245}    Bryon Lozano is a 55 year old, presenting for the following health issues:  Establish Care, Recheck Medication (Review medications and refills), Parkinson, Pain (Left hip - has taken 2 advil ), and Shortness of Breath (Pt reports when she is rushing do things she is out of breath. )         No data to display                Shortness of breath with exertion  This has been ongoing for several years  Did a workup at [facility]- including stress test and negative    Left hip pain      Thyroid issues - taking levothyroxine    Parkinson's - following with neurology    Left hip pain- " "started over the weekend, improving.     Vaginal dryness- today, woke up with a vaginal discharge  No itching. No fever  Has a male partner -     Last pap-smear 1 year ago      History of Present Illness       Reason for visit:  Establish Care, review medications, Parkison's, Left hip pain   She is taking medications regularly.        {MA/LPN/RN Pre-Provider Visit Orders- hCG/UA/Strep (Optional):488193}  {SUPERLIST (Optional):609452}  {additonal problems for provider to add (Optional):425689}    {ROS Picklists (Optional):109583}      Objective    /70 (BP Location: Left arm, Patient Position: Sitting, Cuff Size: Adult Large)   Pulse 69   Temp 97.5  F (36.4  C) (Temporal)   Resp 13   Ht 1.467 m (4' 9.76\")   Wt 63.2 kg (139 lb 4.8 oz)   LMP  (LMP Unknown)   SpO2 100%   BMI 29.36 kg/m    Body mass index is 29.36 kg/m .  Physical Exam   {Exam List (Optional):264714}    {Diagnostic Test Results (Optional):718555}        Signed Electronically by: CRISTI Fall CNP  {Email feedback regarding this note to [email or web address]   :944527}  "   IMPRESSION: No focal consolidation is seen in the lungs to suggest pneumonia. Normal cardiomediastinal silhouette. Several small nodular densities are seen in the left lower lung that were not evident previously measuring up to 3 mm. Findings could   reflect small pulmonary nodules. Consider definitive characterization with nonemergent CT chest exam. Mild degenerative changes are present in the spine.  \    Physical Exam   GENERAL: alert and no distress  NECK: no adenopathy, no asymmetry, masses, or scars  RESP: lungs clear to auscultation - no rales, rhonchi or wheezes  CV: regular rate and rhythm, normal S1 S2, no S3 or S4, no murmur, click or rub, no peripheral edema  ABDOMEN: soft, nontender, no hepatosplenomegaly, no masses and bowel sounds normal  MS: no gross musculoskeletal defects noted, no edema            Signed Electronically by: CRISTI Fall CNP

## 2025-03-28 LAB
C TRACH DNA SPEC QL PROBE+SIG AMP: NEGATIVE
N GONORRHOEA DNA SPEC QL NAA+PROBE: NEGATIVE
SPECIMEN TYPE: NORMAL

## 2025-03-28 RX ORDER — METRONIDAZOLE 500 MG/1
500 TABLET ORAL 2 TIMES DAILY
Qty: 14 TABLET | Refills: 0 | Status: SHIPPED | OUTPATIENT
Start: 2025-03-28 | End: 2025-04-04

## 2025-04-07 ENCOUNTER — ANCILLARY PROCEDURE (OUTPATIENT)
Dept: MAMMOGRAPHY | Facility: HOSPITAL | Age: 55
End: 2025-04-07
Attending: NURSE PRACTITIONER
Payer: COMMERCIAL

## 2025-04-07 ENCOUNTER — HOSPITAL ENCOUNTER (OUTPATIENT)
Dept: CT IMAGING | Facility: HOSPITAL | Age: 55
Discharge: HOME | End: 2025-04-07
Attending: NURSE PRACTITIONER
Payer: COMMERCIAL

## 2025-04-07 DIAGNOSIS — R93.89 ABNORMAL CHEST X-RAY: ICD-10-CM

## 2025-04-07 DIAGNOSIS — Z12.31 VISIT FOR SCREENING MAMMOGRAM: ICD-10-CM

## 2025-04-07 PROCEDURE — T1013 SIGN LANG/ORAL INTERPRETER: HCPCS | Mod: U4

## 2025-04-07 PROCEDURE — 250N000011 HC RX IP 250 OP 636: Performed by: NURSE PRACTITIONER

## 2025-04-07 PROCEDURE — 77067 SCR MAMMO BI INCL CAD: CPT

## 2025-04-07 PROCEDURE — 77063 BREAST TOMOSYNTHESIS BI: CPT

## 2025-04-07 PROCEDURE — 250N000009 HC RX 250: Performed by: NURSE PRACTITIONER

## 2025-04-07 PROCEDURE — 71260 CT THORAX DX C+: CPT

## 2025-04-07 RX ORDER — IOPAMIDOL 755 MG/ML
70 INJECTION, SOLUTION INTRAVASCULAR ONCE
Status: COMPLETED | OUTPATIENT
Start: 2025-04-07 | End: 2025-04-07

## 2025-04-07 RX ADMIN — SODIUM CHLORIDE 100 ML: 9 INJECTION, SOLUTION INTRAVENOUS at 16:36

## 2025-04-07 RX ADMIN — IOPAMIDOL 70 ML: 755 INJECTION, SOLUTION INTRAVENOUS at 16:35

## 2025-05-01 DIAGNOSIS — R06.09 DYSPNEA ON EXERTION: ICD-10-CM

## 2025-05-01 LAB
DLCOCOR-%PRED-PRE: 115 %
DLCOCOR-PRE: 19.07 ML/MIN/MMHG
DLCOUNC-%PRED-PRE: 111 %
DLCOUNC-PRE: 18.4 ML/MIN/MMHG
DLCOUNC-PRED: 16.54 ML/MIN/MMHG
ERV-%PRED-PRE: 26 %
ERV-PRE: 0.22 L
ERV-PRED: 0.85 L
EXPTIME-PRE: 5.89 SEC
FEF2575-%PRED-PRE: 107 %
FEF2575-PRE: 2.17 L/SEC
FEF2575-PRED: 2.01 L/SEC
FEFMAX-%PRED-PRE: 88 %
FEFMAX-PRE: 4.87 L/SEC
FEFMAX-PRED: 5.48 L/SEC
FEV1-%PRED-PRE: 90 %
FEV1-PRE: 1.72 L
FEV1FEV6-PRE: 85 %
FEV1FEV6-PRED: 82 %
FEV1FVC-PRE: 85 %
FEV1FVC-PRED: 82 %
FEV1SVC-PRE: 83 %
FEV1SVC-PRED: 75 %
FIFMAX-PRE: 2.53 L/SEC
FRCPLETH-%PRED-PRE: 123 %
FRCPLETH-PRE: 2.65 L
FRCPLETH-PRED: 2.15 L
FVC-%PRED-PRE: 87 %
FVC-PRE: 2.03 L
FVC-PRED: 2.32 L
HGB BLD-MCNC: 12.3 G/DL
IC-%PRED-PRE: 108 %
IC-PRE: 1.85 L
IC-PRED: 1.71 L
RVPLETH-%PRED-PRE: 165 %
RVPLETH-PRE: 2.42 L
RVPLETH-PRED: 1.47 L
TLCPLETH-%PRED-PRE: 122 %
TLCPLETH-PRE: 4.5 L
TLCPLETH-PRED: 3.68 L
VA-%PRED-PRE: 92 %
VA-PRE: 3.34 L
VC-%PRED-PRE: 82 %
VC-PRE: 2.08 L
VC-PRED: 2.52 L

## 2025-05-12 ENCOUNTER — RESULTS FOLLOW-UP (OUTPATIENT)
Dept: INTERNAL MEDICINE | Facility: CLINIC | Age: 55
End: 2025-05-12
Payer: COMMERCIAL

## 2025-05-20 NOTE — TELEPHONE ENCOUNTER
Pt calls in with . Relayed message. She states her symptoms are gone. Does not want a referral at this time.

## 2025-06-10 ENCOUNTER — TELEPHONE (OUTPATIENT)
Dept: FAMILY MEDICINE | Facility: CLINIC | Age: 55
End: 2025-06-10
Payer: COMMERCIAL

## 2025-06-10 NOTE — TELEPHONE ENCOUNTER
Patient calls via  services. Patient states she has a relative who had an accident 1 month ago and the doctor told him he needs to see a PCP. Informed patient that writer can not discuss other patient's medical care and that she should tell her friend to call and schedule an appointment. Patient verbalizes understanding.

## 2025-06-26 NOTE — PROGRESS NOTES
Diagnosis/Summary/Recommendations:    PATIENT: Marta Osorio  55 year old female     : 1970    LEONARDO: 2025     MRN: [id number]  MRN: [id number]    [id number]    [address]   [place] [address]  [email or web address]      [phone] (H)      Has 3 daughters: 12, 17 and 18   From [place]     Will increase her sinemet to 2 tabs 3/day to see if helps her tremor and overall function  She continues to provide for her 3 daughters who are living with her      Assessment:  (G20) Parkinson disease (H)  (primary encounter diagnosis)  Carbidopa/levodopa Sinemet 25/100  Trihexyphenidyl artane 2mg       Reviewed history, started having R hand tremors that started a few years ago. Diagnosed with mild idiopathic Parkinson's disease by Dr Alonso. Prescribed carbidopa/levodopa 25/100, 1.5 tabs TID.  No gait issues or falls.  No  Progression since visit, has not followed up due to COVID pandemic. Continues to have the tremors and does not feel like the carbidopa/levodopa is helping with this. No side effects from the medication.      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     Cognitive/Driving   Not driving     Mood   Has been tearful in the past about diagnosis  Mood stable   Living with 3 daughters      Hallucinations/delusions   no     Sleep   difficulty falling asleep at night, gets about 6-7 hours, no naps during the day, no dream enactment  1030/11pm -= 7 am  8 hours - Monday through friday     Bladder/Renal/Prostate/Gyn/Other  No urgency      GI/Constipation/GERD   Used to be on omeprazole, but no longer on that        ENDO/Lipid/DM/Bone density/Thyroid  Levothyroxine  synthroid/levothyroid 125mcg     Cardio/heart/Hyper or Hypotensive   No problems     Vision/Dry Eyes/Cataracts/Glaucoma/Macular   No problems  Wears glasses     Heme/Anticoagulation/Antiplatelet/Anemia/Other  no     ENT/Resp  No problems     Skin/Cancer/Seborrhea/other        Musculoskeletal/Pain/Headache  Ibuprofen advil/motrin 200mg as needed     Other:   She has noticed more tremor on the right side and not having significant side effects from the medication  Emotion has increased her tremor.      Has right arm pain      Return in 6 months or 12 months     She is okay to talk with other patient from [place]     Will increase sinemet 2.5 3/day  Propranolol 10mg as needed         Medications     0800 1400 2000     Albuterol inhaler prn      Carbidopa/levodopa Sinemet 25/100 2.5 2.5 2.5     Levothyroxine 100mcg 1      Levothyroxine 112mcg 0         Propranolol inderal 10mg prn                                                            Plan:    She has some dyskinesias today and is aware of this and is not bothering her.   When she wakes up in the morning - she is tired because she is working.   Within 30 minutes the medications seem to work.   Not aware of clear wearing off.     Discussion about parkinson and she does not feel a need for medication change at this time.   We went over information about dyskinesias and wearing.     She is working.   She got her early.   She is not driving.     She is willing to talk with other patient from [place] and will share her number with her.     She had medication filled at this tem    No problems sleeping    Has 4 daughters and 1 son    May want to review her health with the pharmacy  Pharmacy ([facility]) consultation and medication management  Please call the scheduling number @ [phone] to set up an appointment with pharmacists Krysta Grier, Susana Calvin, or Kaley Jones.     Last visit was 5/2023  Return back in 6 to 9 months    6a till 2p working Monday to Friday.      Return back in 6-9 months      Coding statement:   Medical Decision Making:  #  Chronic progressive medical conditions addressed  - see above --   Review and/or interpretation of unique test or documentation from a provider outside of neurology yes   Independent historian provided additional details  no I  Prescription drug management and review of potential side effects and/or monitoring for side effects  -- see above ---  Health impacted by social determinants of health  yes     I have reviewed the note as documented above.  This accurately captures the substance of my conversation with the patient and total time spent preparing for visit, executing visit and completing visit on the day of the visit:  40 minutes.  The portion of this total time included face to face time     The longitudinal plan of care for Marta Osorio was addressed during this visit. Due to the added complexity in care, I will continue to support Marta Osorio in the subsequent management of this condition(s) and with the ongoing continuity of care of this condition(s).      Magdaleno Manzano MD     ______________________________________    Last visit date and details:             ______________________________________      Patient was asked about 14 Review of systems including changes in vision (dry eyes, double vision), hearing, heart, lungs, musculoskeletal, depression, anxiety, snoring, RBD, insomnia, urinary frequency, urinary urgency, constipation, swallowing problems, hematological, ID, allergies, skin problems: seborrhea, endocrinological: thyroid, diabetes, cholesterol; balance, weight changes, and other neurological problems and these were not significant at this time except for   Patient Active Problem List   Diagnosis    Parkinson's disease, unspecified whether dyskinesia present, unspecified whether manifestations fluctuate (H)    Hypothyroidism          Allergies   Allergen Reactions    Trihexyphenidyl       Dizziness       No past surgical history on file.  Past Medical History:   Diagnosis Date    Parkinson's disease, unspecified whether dyskinesia present, unspecified whether manifestations fluctuate (H) 4/11/2024     Social History     Socioeconomic History    Marital status: Single     Spouse name: Not on file    Number of children: Not on file    Years of education: Not on file    Highest education level: Not on file   Occupational History    Not on file   Tobacco Use    Smoking status: Never    Smokeless tobacco: Never   Vaping Use    Vaping status: Never Used   Substance and Sexual Activity    Alcohol use: Not Currently    Drug use: Not on file    Sexual activity: Not on file   Other Topics Concern    Not on file   Social History Narrative    Not on file     Social Drivers of Health     Financial Resource Strain: Low Risk  (3/27/2025)    Financial Resource Strain     Within the past 12 months, have you or your family members you live with been unable to get utilities (heat, electricity) when it was really needed?: No   Food Insecurity: Low Risk  (3/27/2025)    Food Insecurity     Within the past 12 months, did you worry that your food would run out before you got money to buy more?: No     Within the past 12 months, did the food you bought just not last and you didn t have money to get more?: No   Transportation Needs: Low Risk  (3/27/2025)    Transportation Needs     Within the past 12 months, has lack of transportation kept you from medical appointments, getting your medicines, non-medical meetings or appointments, work, or from getting things that you need?: No   Physical Activity: Not on file   Stress: Not on file   Social Connections: Not on file   Interpersonal Safety: Low Risk  (3/27/2025)    Interpersonal Safety     Do you feel physically and emotionally safe where you currently live?: Yes     Within the past 12 months, have you been hit, slapped, kicked or otherwise physically hurt by someone?: No      Within the past 12 months, have you been humiliated or emotionally abused in other ways by your partner or ex-partner?: No   Housing Stability: Low Risk  (3/27/2025)    Housing Stability     Do you have housing? : Yes     Are you worried about losing your housing?: No       Drug and lactation database from the [place] National Library of Medicine:  http://toxnet.nlm.nih.gov/cgi-bin/sis/htmlgen?LACT      B/P: Data Unavailable, T: Data Unavailable, P: Data Unavailable, R: Data Unavailable 0 lbs 0 oz  There were no vitals taken for this visit., There is no height or weight on file to calculate BMI.  Medications and Vitals not listed above were documented in the cart and reviewed by me.     Current Outpatient Medications   Medication Sig Dispense Refill    albuterol (PROAIR HFA/PROVENTIL HFA/VENTOLIN HFA) 108 (90 Base) MCG/ACT inhaler Inhale 2 puffs into the lungs every 4 hours as needed for shortness of breath. 18 g 0    carbidopa-levodopa (SINEMET)  MG tablet 2 to 2.5  three times a day = 7.5 tabs/day 675 tablet 3    levothyroxine (SYNTHROID/LEVOTHROID) 100 MCG tablet TAKE 1 TABLET BY MOUTH IN THE MORNING ON AN EMPTY STOMACH. THYROID CHECK IN 3 MONTHS.      levothyroxine (SYNTHROID/LEVOTHROID) 112 MCG tablet Take 112 mcg by mouth every morning      propranolol (INDERAL) 10 MG tablet Take 1 tablet (10 mg) by mouth 3 times daily as needed (tremor) 270 tablet 3         Magdaleno Manzano MD

## 2025-07-22 ENCOUNTER — OFFICE VISIT (OUTPATIENT)
Dept: NEUROLOGY | Facility: CLINIC | Age: 55
End: 2025-07-22
Payer: COMMERCIAL

## 2025-07-22 VITALS — HEART RATE: 66 BPM | OXYGEN SATURATION: 93 % | DIASTOLIC BLOOD PRESSURE: 85 MMHG | SYSTOLIC BLOOD PRESSURE: 130 MMHG

## 2025-07-22 DIAGNOSIS — G20.A1 PARKINSON'S DISEASE, UNSPECIFIED WHETHER DYSKINESIA PRESENT, UNSPECIFIED WHETHER MANIFESTATIONS FLUCTUATE (H): Primary | ICD-10-CM

## 2025-07-22 PROCEDURE — 99215 OFFICE O/P EST HI 40 MIN: CPT | Performed by: PSYCHIATRY & NEUROLOGY

## 2025-07-22 PROCEDURE — G2211 COMPLEX E/M VISIT ADD ON: HCPCS | Performed by: PSYCHIATRY & NEUROLOGY

## 2025-07-22 PROCEDURE — 3079F DIAST BP 80-89 MM HG: CPT | Performed by: PSYCHIATRY & NEUROLOGY

## 2025-07-22 PROCEDURE — 3075F SYST BP GE 130 - 139MM HG: CPT | Performed by: PSYCHIATRY & NEUROLOGY

## 2025-07-22 RX ORDER — CARBIDOPA AND LEVODOPA 25; 100 MG/1; MG/1
TABLET ORAL
Qty: 675 TABLET | Refills: 3 | Status: SHIPPED | OUTPATIENT
Start: 2025-07-22

## 2025-07-22 NOTE — LETTER
2025       RE: Marta Osorio  [address]  [place] [address]     Dear Colleague,    Thank you for referring your patient, Marta Osorio, to the [facility] [place] at [facility]. Please see a copy of my visit note below.    Diagnosis/Summary/Recommendations:    PATIENT: Marta Osorio  55 year old female     : 1970    LEONARDO: 2025     MRN: [id number]  MRN: [id number]    [id number]    [address]   [place] [address]  [email or web address]      [phone] (H)      Has 3 daughters: 12, 17 and 18   From [place]     Will increase her sinemet to 2 tabs 3/day to see if helps her tremor and overall function  She continues to provide for her 3 daughters who are living with her      Assessment:  (G20) Parkinson disease (H)  (primary encounter diagnosis)  Carbidopa/levodopa Sinemet 25/100  Trihexyphenidyl artane 2mg       Reviewed history, started having R hand tremors that started a few years ago. Diagnosed with mild idiopathic Parkinson's disease by Dr Alonso. Prescribed carbidopa/levodopa 25/100, 1.5 tabs TID.  No gait issues or falls.  No  Progression since visit, has not followed up due to COVID pandemic. Continues to have the tremors and does not feel like the carbidopa/levodopa is helping with this. No side effects from the medication.      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     Cognitive/Driving   Not driving     Mood   Has been tearful in the past about diagnosis  Mood stable   Living with 3 daughters      Hallucinations/delusions   no      Sleep   difficulty falling asleep at night, gets about 6-7 hours, no naps during the day, no dream enactment  1030/11pm -= 7 am  8 hours - Monday through friday     Bladder/Renal/Prostate/Gyn/Other  No urgency      GI/Constipation/GERD   Used to be on omeprazole, but no longer on that        ENDO/Lipid/DM/Bone density/Thyroid  Levothyroxine synthroid/levothyroid 125mcg     Cardio/heart/Hyper or Hypotensive   No problems     Vision/Dry Eyes/Cataracts/Glaucoma/Macular   No problems  Wears glasses     Heme/Anticoagulation/Antiplatelet/Anemia/Other  no     ENT/Resp  No problems     Skin/Cancer/Seborrhea/other        Musculoskeletal/Pain/Headache  Ibuprofen advil/motrin 200mg as needed     Other:   She has noticed more tremor on the right side and not having significant side effects from the medication  Emotion has increased her tremor.      Has right arm pain      Return in 6 months or 12 months     She is okay to talk with other patient from [place]     Will increase sinemet 2.5 3/day  Propranolol 10mg as needed         Medications     0800 1400 2000     Albuterol inhaler prn      Carbidopa/levodopa Sinemet 25/100 2.5 2.5 2.5     Levothyroxine 100mcg 1      Levothyroxine 112mcg 0         Propranolol inderal 10mg prn                                                            Plan:    She has some dyskinesias today and is aware of this and is not bothering her.   When she wakes up in the morning - she is tired because she is working.   Within 30 minutes the medications seem to work.   Not aware of clear wearing off.     Discussion about parkinson and she does not feel a need for medication change at this time.   We went over information about dyskinesias and wearing.     She is working.   She got her early.   She is not driving.     She is willing to talk with other patient from [place] and will share her number with her.     She had medication filled at this tem    No problems sleeping    Has 4 daughters and 1  son    May want to review her health with the pharmacy  Pharmacy ([facility]) consultation and medication management  Please call the scheduling number @ [phone] to set up an appointment with pharmacists Krysta Grier, Susana Calvin, or Kaley Jones.     Last visit was 5/2023  Return back in 6 to 9 months    6a till 2p working Monday to Friday.     Return back in 6-9 months      Coding statement:   Medical Decision Making:  #  Chronic progressive medical conditions addressed  - see above --   Review and/or interpretation of unique test or documentation from a provider outside of neurology yes   Independent historian provided additional details  no I  Prescription drug management and review of potential side effects and/or monitoring for side effects  -- see above ---  Health impacted by social determinants of health  yes     I have reviewed the note as documented above.  This accurately captures the substance of my conversation with the patient and total time spent preparing for visit, executing visit and completing visit on the day of the visit:  40 minutes.  The portion of this total time included face to face time     The longitudinal plan of care for Marta Osorio was addressed during this visit. Due to the added complexity in care, I will continue to support Marta Osorio in the subsequent management of this condition(s) and with the ongoing continuity of care of this condition(s).      Magdaleno Manzano MD     ______________________________________    Last visit date and details:             ______________________________________      Patient was asked about 14 Review of systems including changes in vision (dry eyes, double vision), hearing, heart, lungs, musculoskeletal, depression, anxiety, snoring, RBD, insomnia, urinary frequency, urinary urgency, constipation, swallowing problems, hematological, ID, allergies, skin problems: seborrhea, endocrinological: thyroid, diabetes,  cholesterol; balance, weight changes, and other neurological problems and these were not significant at this time except for   Patient Active Problem List   Diagnosis     Parkinson's disease, unspecified whether dyskinesia present, unspecified whether manifestations fluctuate (H)     Hypothyroidism          Allergies   Allergen Reactions     Trihexyphenidyl      Dizziness       No past surgical history on file.  Past Medical History:   Diagnosis Date     Parkinson's disease, unspecified whether dyskinesia present, unspecified whether manifestations fluctuate (H) 4/11/2024     Social History     Socioeconomic History     Marital status: Single     Spouse name: Not on file     Number of children: Not on file     Years of education: Not on file     Highest education level: Not on file   Occupational History     Not on file   Tobacco Use     Smoking status: Never     Smokeless tobacco: Never   Vaping Use     Vaping status: Never Used   Substance and Sexual Activity     Alcohol use: Not Currently     Drug use: Not on file     Sexual activity: Not on file   Other Topics Concern     Not on file   Social History Narrative     Not on file     Social Drivers of Health     Financial Resource Strain: Low Risk  (3/27/2025)    Financial Resource Strain      Within the past 12 months, have you or your family members you live with been unable to get utilities (heat, electricity) when it was really needed?: No   Food Insecurity: Low Risk  (3/27/2025)    Food Insecurity      Within the past 12 months, did you worry that your food would run out before you got money to buy more?: No      Within the past 12 months, did the food you bought just not last and you didn t have money to get more?: No   Transportation Needs: Low Risk  (3/27/2025)    Transportation Needs      Within the past 12 months, has lack of transportation kept you from medical appointments, getting your medicines, non-medical meetings or appointments, work, or from  getting things that you need?: No   Physical Activity: Not on file   Stress: Not on file   Social Connections: Not on file   Interpersonal Safety: Low Risk  (3/27/2025)    Interpersonal Safety      Do you feel physically and emotionally safe where you currently live?: Yes      Within the past 12 months, have you been hit, slapped, kicked or otherwise physically hurt by someone?: No      Within the past 12 months, have you been humiliated or emotionally abused in other ways by your partner or ex-partner?: No   Housing Stability: Low Risk  (3/27/2025)    Housing Stability      Do you have housing? : Yes      Are you worried about losing your housing?: No       Drug and lactation database from the [place] National Library of Medicine:  http://toxnet.nlm.nih.gov/cgi-bin/sis/htmlgen?LACT      B/P: Data Unavailable, T: Data Unavailable, P: Data Unavailable, R: Data Unavailable 0 lbs 0 oz  There were no vitals taken for this visit., There is no height or weight on file to calculate BMI.  Medications and Vitals not listed above were documented in the cart and reviewed by me.     Current Outpatient Medications   Medication Sig Dispense Refill     albuterol (PROAIR HFA/PROVENTIL HFA/VENTOLIN HFA) 108 (90 Base) MCG/ACT inhaler Inhale 2 puffs into the lungs every 4 hours as needed for shortness of breath. 18 g 0     carbidopa-levodopa (SINEMET)  MG tablet 2 to 2.5  three times a day = 7.5 tabs/day 675 tablet 3     levothyroxine (SYNTHROID/LEVOTHROID) 100 MCG tablet TAKE 1 TABLET BY MOUTH IN THE MORNING ON AN EMPTY STOMACH. THYROID CHECK IN 3 MONTHS.       levothyroxine (SYNTHROID/LEVOTHROID) 112 MCG tablet Take 112 mcg by mouth every morning       propranolol (INDERAL) 10 MG tablet Take 1 tablet (10 mg) by mouth 3 times daily as needed (tremor) 270 tablet 3         Magdaleno Manzano MD      Again, thank you for allowing me to participate in the care of your patient.      Sincerely,    Magdaleno Manzano MD

## 2025-07-22 NOTE — PATIENT INSTRUCTIONS
Medications     0800 1400 2000     Albuterol inhaler prn      Carbidopa/levodopa Sinemet 25/100 2.5 2.5 2.5     Levothyroxine 100mcg 1      Levothyroxine 112mcg 0         Propranolol inderal 10mg prn                                                            Plan:    She has some dyskinesias today and is aware of this and is not bothering her.   When she wakes up in the morning - she is tired because she is working.   Within 30 minutes the medications seem to work.   Not aware of clear wearing off.     Discussion about parkinson and she does not feel a need for medication change at this time.   We went over information about dyskinesias and wearing.     She is working.   She got her early.   She is not driving.     She is willing to talk with other patient from [place] and will share her number with her.     She had medication filled at this tem    No problems sleeping    Has 4 daughters and 1 son    May want to review her health with the pharmacy  Pharmacy (MTM) consultation and medication management  Please call the scheduling number @ [phone] to set up an appointment with pharmacists Krysta Grier, Susana Calvin, or Kaley Jones.     Last visit was 5/2023  Return back in 6 to 9 months    6a till 2p working Monday to Friday.     Return back in 6-9 months

## 2025-07-24 ENCOUNTER — TELEPHONE (OUTPATIENT)
Dept: NEUROLOGY | Facility: CLINIC | Age: 55
End: 2025-07-24
Payer: COMMERCIAL

## 2025-07-24 ENCOUNTER — APPOINTMENT (OUTPATIENT)
Dept: INTERPRETER SERVICES | Facility: CLINIC | Age: 55
End: 2025-07-24
Payer: COMMERCIAL

## 2025-07-24 NOTE — TELEPHONE ENCOUNTER
MTM referral from: [facility] visit (referral by provider)    MTM referral outreach attempt #2 on July 24, 2025 at 3:30 PM      Outcome: Patient not reachable after several attempts, routed to Pharmacist Team/Provider as an FYI    Use HB for the carrier/Plan on the flowsheet      PV [employer] Message Sent    CHANTELLE Oliveira